# Patient Record
Sex: FEMALE | Race: WHITE | Employment: FULL TIME | URBAN - METROPOLITAN AREA
[De-identification: names, ages, dates, MRNs, and addresses within clinical notes are randomized per-mention and may not be internally consistent; named-entity substitution may affect disease eponyms.]

---

## 2022-12-07 ENCOUNTER — HOSPITAL ENCOUNTER (EMERGENCY)
Age: 47
Discharge: HOME | End: 2022-12-07
Attending: STUDENT IN AN ORGANIZED HEALTH CARE EDUCATION/TRAINING PROGRAM
Payer: COMMERCIAL

## 2022-12-07 ENCOUNTER — APPOINTMENT (OUTPATIENT)
Dept: CT IMAGING | Age: 47
End: 2022-12-07
Attending: STUDENT IN AN ORGANIZED HEALTH CARE EDUCATION/TRAINING PROGRAM
Payer: COMMERCIAL

## 2022-12-07 VITALS
SYSTOLIC BLOOD PRESSURE: 197 MMHG | BODY MASS INDEX: 37.67 KG/M2 | WEIGHT: 240 LBS | DIASTOLIC BLOOD PRESSURE: 122 MMHG | HEART RATE: 84 BPM | HEIGHT: 67 IN | OXYGEN SATURATION: 97 % | TEMPERATURE: 98.3 F | RESPIRATION RATE: 20 BRPM

## 2022-12-07 DIAGNOSIS — I10 PRIMARY HYPERTENSION: Primary | ICD-10-CM

## 2022-12-07 PROCEDURE — 70450 CT HEAD/BRAIN W/O DYE: CPT

## 2022-12-07 PROCEDURE — 99284 EMERGENCY DEPT VISIT MOD MDM: CPT

## 2022-12-07 RX ORDER — LISINOPRIL 20 MG/1
20 TABLET ORAL DAILY
Qty: 30 TABLET | Refills: 0 | Status: SHIPPED | OUTPATIENT
Start: 2022-12-07 | End: 2023-01-06

## 2022-12-08 NOTE — ED PROVIDER NOTES
EMERGENCY DEPARTMENT HISTORY AND PHYSICAL EXAM      Date: 12/7/2022  Patient Name: Maria De Jesus Dalton    History of Presenting Illness     Chief Complaint   Patient presents with    Hypertension       History Provided By: Patient    HPI: Maria De Jesus Dalton, 49 y.o. female with a past medical history significant hypertension presents to the ED with cc of high blood pressure. Patient was sent from neuro-ophthalmology clinic for evaluation of high blood pressure and suspicion for papilledema. Patient states her ophthalmologist referred her to a neuro-ophthalmologist several weeks ago, finally went to appointment today and was told that she has potentially high brain pressure, was sent to emergency department for MRI, MRV. Meanwhile patient denies any headache, blurry vision double vision, weakness in any extremity, chest pains or shortness of breath. Patient states she does have a distant history of hypertension however has not had a PCP for several years and has not taken any blood pressure medications. There are no other complaints, changes, or physical findings at this time. PCP: None    No current facility-administered medications on file prior to encounter. Current Outpatient Medications on File Prior to Encounter   Medication Sig Dispense Refill    albuterol (PROAIR HFA) 90 mcg/actuation inhaler USE 2 PUFFS EVERY 6 HOURSAS NEEDED AS DIRECTED 1 Inhaler 5    labetalol (NORMODYNE) 100 mg tablet 2 tabs in AM and 1 Tab in PM 90 Tab 3    norgestimate-ethinyl estradiol (SPRINTEC, 28,) 0.25-35 mg-mcg per tablet Take one tablet every day at approx the same time every day 28 Tab 4    AMBULATORY BREAST PUMP Use as directed. 1 Device 0    ibuprofen (MOTRIN) 800 mg tablet Take 1 Tab by mouth every eight (8) hours. 30 Tab 0    oxyCODONE-acetaminophen (PERCOCET) 5-325 mg per tablet Take 1 Tab by mouth every six (6) hours as needed for Pain. Max Daily Amount: 4 Tabs.  20 Tab 0    aspirin 81 mg tablet Take 1 Tab by mouth daily. 1 Tab 0    PRENATAL VITS W-CA,FE,FA,<1MG, (PRENATAL VITAMIN PO) Take  by mouth. ZYRTEC 10 mg Tab take 10 mg by mouth daily. Past History     Past Medical History:  Past Medical History:   Diagnosis Date    AR (allergic rhinitis) 2009    Asthma 2009    Eczema 2009    Essential hypertension     Labetelol 300 mg bid    HTN 2009       Past Surgical History:  Past Surgical History:   Procedure Laterality Date    HX  SECTION N/A 5/4/15    Fetal intolerace of labor       Family History:  Family History   Problem Relation Age of Onset    Hypertension Mother     Cancer Mother         breast    Hypertension Father     Hypertension Brother     Diabetes Brother        Social History:  Social History     Tobacco Use    Smoking status: Never    Smokeless tobacco: Never   Substance Use Topics    Alcohol use: No    Drug use: No       Allergies: Allergies   Allergen Reactions    Amoxicillin Hives    Chocolate Flavor Nausea and Vomiting    Coffee (Coffea Arabica) Nausea and Vomiting    Milk Nausea and Vomiting    Potato Starch Other (comments)    Nguyen Pod Other (comments)     exzema and migraines. WHITE BEANS    Onion Other (comments)     exzema and migraines    Pineapple Other (comments)     exema and migraines    Sesame Oil Other (comments)     SESAME SEEDS cause exzema and migraines    Tomato Other (comments)     exema and migraines    Seafood [Shellfish Containing Products] Hives     Hives itching         Review of Systems     Review of Systems   Constitutional:  Negative for activity change, chills, fatigue and fever. HENT:  Negative for congestion and trouble swallowing. Eyes:  Negative for photophobia and visual disturbance. Respiratory:  Negative for cough, chest tightness and shortness of breath. Cardiovascular:  Negative for chest pain and palpitations. Gastrointestinal:  Negative for abdominal distention, abdominal pain, diarrhea, nausea and vomiting. Genitourinary:  Negative for dysuria, flank pain and frequency. Musculoskeletal:  Negative for arthralgias, back pain and myalgias. Skin:  Negative for color change, pallor and rash. Neurological:  Negative for dizziness, tremors, weakness and headaches. Psychiatric/Behavioral:  Negative for confusion. Physical Exam     Physical Exam  Vitals and nursing note reviewed. Constitutional:       General: She is not in acute distress. Appearance: Normal appearance. She is normal weight. She is not ill-appearing. HENT:      Head: Normocephalic and atraumatic. Nose: Nose normal.      Mouth/Throat:      Mouth: Mucous membranes are moist.   Eyes:      General: No scleral icterus. Extraocular Movements: Extraocular movements intact. Conjunctiva/sclera: Conjunctivae normal.      Pupils: Pupils are equal, round, and reactive to light. Cardiovascular:      Rate and Rhythm: Normal rate and regular rhythm. Pulses: Normal pulses. Heart sounds: Normal heart sounds. Pulmonary:      Effort: Pulmonary effort is normal.      Breath sounds: Normal breath sounds. Abdominal:      General: Abdomen is flat. Bowel sounds are normal.      Palpations: Abdomen is soft. Tenderness: There is no abdominal tenderness. There is no guarding. Musculoskeletal:         General: No tenderness. Normal range of motion. Cervical back: Normal range of motion and neck supple. No muscular tenderness. Skin:     General: Skin is warm and dry. Neurological:      General: No focal deficit present. Mental Status: She is alert and oriented to person, place, and time. Sensory: No sensory deficit. Motor: No weakness. Diagnostic Study Results     Labs -   No results found for this or any previous visit (from the past 12 hour(s)).     Radiologic Studies -   @lastxrresult@  CT Results  (Last 48 hours)                 12/07/22 2048  CT HEAD WO CONT Final result    Impression:  No acute findings. Narrative:  EXAM: CT HEAD WO CONT       INDICATION: htn, r/o CVA       COMPARISON: None. CONTRAST: None. TECHNIQUE: Unenhanced CT of the head was performed using 5 mm images. Brain and   bone windows were generated. Coronal and sagittal reformats. CT dose reduction   was achieved through use of a standardized protocol tailored for this   examination and automatic exposure control for dose modulation. FINDINGS:   The ventricles and sulci are normal in size, shape and configuration. . There is   no significant white matter disease. There is no intracranial hemorrhage,   extra-axial collection, or mass effect. The basilar cisterns are open. No CT   evidence of acute infarct. The bone windows demonstrate no abnormalities. The visualized portions of the   paranasal sinuses and mastoid air cells are clear. CXR Results  (Last 48 hours)      None              Medical Decision Making   I am the first provider for this patient. I reviewed the vital signs, available nursing notes, past medical history, past surgical history, family history and social history. Vital Signs-Reviewed the patient's vital signs. Patient Vitals for the past 12 hrs:   Temp Pulse Resp BP SpO2   12/07/22 2142 -- -- -- (!) 197/122 --   12/07/22 2001 98.3 °F (36.8 °C) 84 20 (!) 217/138 97 %       Records Reviewed: Nursing Notes    The patient presents with high blood pressure potential papilledema on ophthalmology visit today with a differential diagnosis of increased intracranial pressure, CVA, intracranial hemorrhage, essential hypertension, noncompliance      Provider Notes (Medical Decision Making):     MDM     79-year-old female history of hypertension, has been noncompliant for several years presents emergency department for evaluation of presumed papilledema as noted by neuro-ophthalmologist.    Patient denies any visual symptoms no chest pain shortness of breath.     Physical shows well-appearing female no distress, is markedly hypertensive on triage 217/138, on my cursory Ortho exam pupils equal round, extraocular movements intact, no visual disturbance. Will obtain head CT to rule out intracranial hemorrhage and/or bleed, provide patient with antihypertensive medication, and has appointment with PCP in 2 weeks. ED Course:   Initial assessment performed. The patients presenting problems have been discussed, and they are in agreement with the care plan formulated and outlined with them. I have encouraged them to ask questions as they arise throughout their visit. ED Course as of 12/07/22 2148   Wed Dec 07, 2022   2131 CT head shows no acute abnormalities, will discharge patient home. [PZ]   2140 I discussed results with patient, I informed patient about signs symptoms of acute increase of intracranial pressure including visual disturbance trouble walking, headache, nausea vomiting. If any of those symptoms occur instructed patient to come back to the ED. patient amenable, states that she remembers she was on lisinopril before, will prescribe patient 1 month of lisinopril, repeat blood pressure shows slight decrease, 190s over 120s, at this time no indication for emergent blood pressure lowering. [PZ]      ED Course User Index  [PZ] Unruly Alston MD         PROCEDURES  Procedures         PLAN:  1. Current Discharge Medication List        START taking these medications    Details   lisinopriL (PRINIVIL, ZESTRIL) 20 mg tablet Take 1 Tablet by mouth daily for 30 days. Qty: 30 Tablet, Refills: 0  Start date: 12/7/2022, End date: 1/6/2023           2. Follow-up Information       Follow up With Specialties Details Why Contact Info    Your primary care physician  In 1 week As appointed           Return to ED if worse     Diagnosis     Clinical Impression:   1.  Primary hypertension

## 2022-12-08 NOTE — ED TRIAGE NOTES
Pt was seen at the eye doctor today and was told that her BP is elevated and needed to come here to be seen. Pt's eye doctor is requesting a brain MRI for concerns for \"increased ICP\".

## 2022-12-14 ENCOUNTER — TRANSCRIBE ORDER (OUTPATIENT)
Dept: SCHEDULING | Age: 47
End: 2022-12-14

## 2022-12-14 DIAGNOSIS — H47.11 PAPILLEDEMA ASSOCIATED WITH INCREASED INTRACRANIAL PRESSURE: Primary | ICD-10-CM

## 2022-12-20 ENCOUNTER — OFFICE VISIT (OUTPATIENT)
Dept: FAMILY MEDICINE CLINIC | Age: 47
End: 2022-12-20

## 2022-12-20 VITALS
BODY MASS INDEX: 40.46 KG/M2 | TEMPERATURE: 98.2 F | DIASTOLIC BLOOD PRESSURE: 115 MMHG | WEIGHT: 257.8 LBS | HEART RATE: 73 BPM | HEIGHT: 67 IN | OXYGEN SATURATION: 98 % | SYSTOLIC BLOOD PRESSURE: 167 MMHG

## 2022-12-20 DIAGNOSIS — I10 ESSENTIAL HYPERTENSION: Primary | ICD-10-CM

## 2022-12-20 DIAGNOSIS — E66.01 CLASS 3 SEVERE OBESITY DUE TO EXCESS CALORIES WITH SERIOUS COMORBIDITY AND BODY MASS INDEX (BMI) OF 40.0 TO 44.9 IN ADULT (HCC): ICD-10-CM

## 2022-12-20 DIAGNOSIS — L40.9 PSORIASIS: ICD-10-CM

## 2022-12-20 PROCEDURE — 3074F SYST BP LT 130 MM HG: CPT | Performed by: STUDENT IN AN ORGANIZED HEALTH CARE EDUCATION/TRAINING PROGRAM

## 2022-12-20 PROCEDURE — 99204 OFFICE O/P NEW MOD 45 MIN: CPT | Performed by: STUDENT IN AN ORGANIZED HEALTH CARE EDUCATION/TRAINING PROGRAM

## 2022-12-20 PROCEDURE — 3078F DIAST BP <80 MM HG: CPT | Performed by: STUDENT IN AN ORGANIZED HEALTH CARE EDUCATION/TRAINING PROGRAM

## 2022-12-20 RX ORDER — FLUOCINONIDE TOPICAL SOLUTION USP, 0.05% 0.5 MG/ML
SOLUTION TOPICAL
Qty: 60 ML | Refills: 0 | Status: SHIPPED | OUTPATIENT
Start: 2022-12-20

## 2022-12-20 RX ORDER — TRIAMCINOLONE ACETONIDE 1 MG/G
OINTMENT TOPICAL 2 TIMES DAILY
Qty: 80 G | Refills: 0 | Status: SHIPPED | OUTPATIENT
Start: 2022-12-20

## 2022-12-20 RX ORDER — LISINOPRIL 40 MG/1
40 TABLET ORAL DAILY
Qty: 30 TABLET | Refills: 1 | Status: SHIPPED | OUTPATIENT
Start: 2022-12-20

## 2022-12-20 NOTE — PROGRESS NOTES
Erick Hall is a 49 y.o. female , id x 2(name and ). Reviewed record, history, and  medications. Chief Complaint   Patient presents with    New Patient    Skin Problem     States that it cleared up when she had her dgt and didn't eat sugar for 7 months v    Blood Pressure Check     Eye dr concerned about BP. Is sending for CT. Was also told at one point that she had pitting edema  Has been treated for htn in the past with lisinopril and is taking it now     Ankle swelling              Vitals:    22 0934 22 0954   BP: (!) 171/113 (!) 167/115   Pulse: 73 73   Temp: 98.2 °F (36.8 °C)    SpO2: 98%    Weight: 257 lb 12.8 oz (116.9 kg)    Height: 5' 7\" (1.702 m)        Coordination of Care Questionnaire:   1. Have you been to the ER, urgent care clinic since your last visit? Hospitalized since your last visit? No    2. Have you seen or consulted any other health care providers outside of the [address] [place] [address] since your last visit? Include any pap smears or colon screening. [facility] found optic nerve was inflammed and fuzzy they sent pt to neuro opthal advised to go to ER asap for BP      3 most recent PHQ Screens 2022   Little interest or pleasure in doing things Not at all   Feeling down, depressed, irritable, or hopeless Several days   Total Score PHQ 2 1       Social Determinants of Health     Tobacco Use: Low Risk     Smoking Tobacco Use: Never    Smokeless Tobacco Use: Never    Passive Exposure: Not on file   Alcohol Use: Not on file   Financial Resource Strain: Low Risk     Difficulty of Paying Living Expenses: Not very hard   Food Insecurity: No Food Insecurity    Worried About Running Out of Food in the Last Year: Never true    Ran Out of Food in the Last Year: Never true   Transportation Needs: No Transportation Needs    Lack of Transportation (Medical): No    Lack of Transportation (Non-Medical):  No   Physical Activity: Not on file   Stress: Not on file   Social Connections: Not on file   Intimate Partner Violence: Not on file   Depression: Not at risk    PHQ-2 Score: 1   Housing Stability: Low Risk     Unable to Pay for Housing in the Last Year: No    Number of Places Lived in the Last Year: 2    Unstable Housing in the Last Year: No       Patient is accompanied by self I have received verbal consent from Isabel Donnelly to discuss any/all medical information while they are present in the room.

## 2022-12-20 NOTE — LETTER
1/3/2023    [address] [id number]      Dear Telma Husain:    Attached are the results of your most recent lab work. I have the following recommendations:      1.  Your metabolic panel which looks at your blood glucose, electrolytes, liver function, and kidney function looks good.       2.  Normal A1c, no sign of diabetes or prediabetes.  Focus on healthy lifestyle to prevent diabetes.      3.  Your TSH which screens for thyroid disease came back normal. This means you likely do not have hyper or hypothyroidism.       4.  Your inflammatory markers are normal.  No sign of autoimmune or psoriatic arthritis on labs.     5.  Your CBC which looks at your white blood cells, red blood cells, and platelets came back looking normal.  No sign of infection or anemia.  Your red blood cells are on the small side, this can happen with iron deficiency.  Consider taking an iron supplement a couple days a week, most common side effect is constipation.     6. Your cholesterol is normal.  Continue with your efforts to follow a heart healthy diet and exercise routinely. As you know the BEST way to lower cholesterol is to follow a strict diet that is low fat combined with regular exercise. Here are a few tips on how to do this:  - Avoid foods that are high in saturated and trans fats (especially fried foods)   - Replace butter with olive oil, avocado oil, other oils that are primarily high in unsaturated fats  - Eat lots of fresh fruits and vegetables  - Choose fish, chicken, and turkey as your serving of meat  - Avoid too many processed foods  - Use whole wheat bread, pasta, rice  You should also try and do 30 minutes of aerobic exercise most days of the week. All of these will contribute to lowering your cholesterol and decrease your risk of heart disease and stroke.      Some values may be minimally outside the \"normal\" range but are not harmful or clinically significant.   Please let me know if you have any questions or concerns regarding these results. I recommend we repeat fasting labs in 6 to 12 months.     Sincerely,      Teresita Christianson MD    Resulted Orders   CBC W/O DIFF   Result Value Ref Range    WBC 6.5 3.4 - 10.8 x10E3/uL    RBC 4.98 3.77 - 5.28 x10E6/uL    HGB 11.8 11.1 - 15.9 g/dL    HCT 38.3 34.0 - 46.6 %    MCV 77 (L) 79 - 97 fL    MCH 23.7 (L) 26.6 - 33.0 pg    MCHC 30.8 (L) 31.5 - 35.7 g/dL    RDW 15.0 11.7 - 15.4 %    PLATELET 007 361 - 450 x10E3/uL    Narrative    Performed at:  [address]  [id number]  : Mckenzie Lees MD, Phone:  [phone]

## 2022-12-20 NOTE — PROGRESS NOTES
Progress Note    she is a 49y.o. year old female who presents for evalution. Assessment/ Plan:   Diagnoses and all orders for this visit:    1. Essential hypertension -uncontrolled, increase lisinopril to 40 mg daily. Continue home blood pressure monitoring and communicate blood pressures via MyChart in 2 weeks  -     lisinopriL (PRINIVIL, ZESTRIL) 40 mg tablet; Take 1 Tablet by mouth daily.  -     AMB POC URINALYSIS DIP STICK AUTO W/O MICRO    2. Psoriasis -skin findings consistent with psoriasis. Start medications as below. Labs to assess for systemic disease  -     triamcinolone acetonide (KENALOG) 0.1 % ointment; Apply  to affected area two (2) times a day. use thin layer  -     fluocinoNIDE (LIDEX) 0.05 % external solution; Apply a thin layer to scalp 2 times daily  -     CYCLIC CITRUL PEPTIDE AB, IGG; Future  -     FLAVIO, DIRECT, W/REFLEX; Future  -     SED RATE (ESR); Future  -     C REACTIVE PROTEIN, QT; Future    3. Class 3 severe obesity due to excess calories with serious comorbidity and body mass index (BMI) of 40.0 to 44.9 in [facility]) -reviewed recommendations for healthy lifestyle. Labs to assess for comorbidity  -     METABOLIC PANEL, COMPREHENSIVE; Future  -     HEMOGLOBIN A1C WITH EAG; Future  -     LIPID PANEL; Future  -     CBC W/O DIFF; Future  -     TSH 3RD GENERATION; Future    Other orders  -     RHEUMATOID FACTOR, QL  -     CYCLIC CITRUL PEPTIDE AB, IGG  -     FLAVIO, DIRECT, W/REFLEX  -     SED RATE (ESR)  -     C REACTIVE PROTEIN, QT  -     CVD REPORT       Follow-up and Dispositions    Return in about 1 month (around 1/20/2023) for follow-up blood pressure. I have discussed the diagnosis with the patient and the intended plan as seen in the above orders. The patient has received an after-visit summary and questions were answered concerning future plans. Pt conveyed understanding of plan.     Medication Side Effects and Warnings were discussed with patient        Subjective:     Chief Complaint   Patient presents with    New Patient    Skin Problem     States that it cleared up when she had her dgt and didn't eat sugar for 7 months v    Blood Pressure Check     Eye dr concerned about BP. Is sending for CT. Was also told at one point that she had pitting edema  Has been treated for htn in the past with lisinopril and is taking it now     Ankle swelling            Issues with blood pressure  At routine eye exam was sent to neuro-ophthalmologist due to optic nerve inflammation  Had been sent to ER for blood pressure medicine  Had a head CT and has a MRI scheduled. No home blood pressure monitoring. Blood pressure medication started 1 week ago. 2 kids, 9 yo girl Guillermina Aguayo) and 10 yo boy Harry Carson, has autism)    Issues with scalp and bilateral hand rash  Longstanding issue  While pregnant with her daughter, she didn't eat sugar for 7 months and skin was better. Reviewed PmHx, RxHx, FmHx, SocHx, AllgHx and updated and dated in the chart. Review of Systems - negative except as listed above in the HPI    Objective:     Vitals:    12/20/22 0934 12/20/22 0954   BP: (!) 171/113 (!) 167/115   Pulse: 73 73   Temp: 98.2 °F (36.8 °C)    SpO2: 98%    Weight: 257 lb 12.8 oz (116.9 kg)    Height: 5' 7\" (1.702 m)        Current Outpatient Medications   Medication Sig    lisinopriL (PRINIVIL, ZESTRIL) 40 mg tablet Take 1 Tablet by mouth daily. triamcinolone acetonide (KENALOG) 0.1 % ointment Apply  to affected area two (2) times a day. use thin layer    fluocinoNIDE (LIDEX) 0.05 % external solution Apply a thin layer to scalp 2 times daily    ZYRTEC 10 mg Tab take 10 mg by mouth daily. No current facility-administered medications for this visit. Physical Exam  Vitals and nursing note reviewed. Constitutional:       General: She is not in acute distress. Appearance: Normal appearance. She is obese.  She is not ill-appearing, toxic-appearing or diaphoretic. HENT:      Head: Normocephalic and atraumatic. Eyes:      General: No scleral icterus. Right eye: No discharge. Left eye: No discharge. Conjunctiva/sclera: Conjunctivae normal.   Cardiovascular:      Rate and Rhythm: Normal rate and regular rhythm. Pulses: Normal pulses. Heart sounds: Normal heart sounds. Pulmonary:      Effort: Pulmonary effort is normal. No respiratory distress. Breath sounds: Normal breath sounds. Musculoskeletal:         General: No tenderness. Cervical back: No rigidity. Skin:     General: Skin is warm and dry. Findings: Rash (erythematous plaques with silver scale over scalp and bilateral hands) present. Neurological:      General: No focal deficit present. Mental Status: She is alert. Psychiatric:         Mood and Affect: Mood normal.         Behavior: Behavior normal.         Thought Content:  Thought content normal.         Judgment: Judgment normal.            Susann Eisenmenger, MD

## 2022-12-20 NOTE — PATIENT INSTRUCTIONS
You should purchase a blood pressure cuff to check your blood pressure at home. [employer] brand upper arm cuff    Check first thing in the morning. You should be relaxed, seated with back supported, feet flat on the floor, arm with cuff resting at heart height. You should check your blood pressure 1-3 times. Please write down your blood pressures and bring this record and your blood pressure cuff/machine to your follow-up visit. I would like for your blood pressure to be less than 130 for the top number and less than 90 for the bottom number. Please follow-up sooner for consistently high blood pressure readings at home. Communicate your numbers after 1 week on the increased dose of lisinopril. Exercise recommendations  150 minutes of moderate intensity cardio exercise in the week  3 days of total body strength training  Work on stretching/flexibility as well. It's great to get outside! But you can also check out youtube for fun videos and workouts.   I like yoga with candice

## 2022-12-22 LAB
ALBUMIN SERPL-MCNC: 4.3 G/DL (ref 3.8–4.8)
ALBUMIN/GLOB SERPL: 1.4 {RATIO} (ref 1.2–2.2)
ALP SERPL-CCNC: 73 IU/L (ref 44–121)
ALT SERPL-CCNC: 14 IU/L (ref 0–32)
ANA SER QL: NEGATIVE
AST SERPL-CCNC: 15 IU/L (ref 0–40)
BILIRUB SERPL-MCNC: 0.3 MG/DL (ref 0–1.2)
BUN SERPL-MCNC: 10 MG/DL (ref 6–24)
BUN/CREAT SERPL: 14 (ref 9–23)
CALCIUM SERPL-MCNC: 9.2 MG/DL (ref 8.7–10.2)
CCP IGA+IGG SERPL IA-ACNC: 6 UNITS (ref 0–19)
CHLORIDE SERPL-SCNC: 101 MMOL/L (ref 96–106)
CHOLEST SERPL-MCNC: 164 MG/DL (ref 100–199)
CO2 SERPL-SCNC: 22 MMOL/L (ref 20–29)
CREAT SERPL-MCNC: 0.7 MG/DL (ref 0.57–1)
CRP SERPL-MCNC: 2 MG/L (ref 0–10)
EGFR: 107 ML/MIN/1.73
ERYTHROCYTE [DISTWIDTH] IN BLOOD BY AUTOMATED COUNT: 15 % (ref 11.7–15.4)
ERYTHROCYTE [SEDIMENTATION RATE] IN BLOOD BY WESTERGREN METHOD: 32 MM/HR (ref 0–32)
EST. AVERAGE GLUCOSE BLD GHB EST-MCNC: 111 MG/DL
GLOBULIN SER CALC-MCNC: 3.1 G/DL (ref 1.5–4.5)
GLUCOSE SERPL-MCNC: 75 MG/DL (ref 70–99)
HBA1C MFR BLD: 5.5 % (ref 4.8–5.6)
HCT VFR BLD AUTO: 38.3 % (ref 34–46.6)
HDLC SERPL-MCNC: 43 MG/DL
HGB BLD-MCNC: 11.8 G/DL (ref 11.1–15.9)
IMP & REVIEW OF LAB RESULTS: NORMAL
LDLC SERPL CALC-MCNC: 95 MG/DL (ref 0–99)
MCH RBC QN AUTO: 23.7 PG (ref 26.6–33)
MCHC RBC AUTO-ENTMCNC: 30.8 G/DL (ref 31.5–35.7)
MCV RBC AUTO: 77 FL (ref 79–97)
PLATELET # BLD AUTO: 301 X10E3/UL (ref 150–450)
POTASSIUM SERPL-SCNC: 4.1 MMOL/L (ref 3.5–5.2)
PROT SERPL-MCNC: 7.4 G/DL (ref 6–8.5)
RBC # BLD AUTO: 4.98 X10E6/UL (ref 3.77–5.28)
RHEUMATOID FACT SERPL-ACNC: <10 IU/ML (ref 0–15)
SODIUM SERPL-SCNC: 137 MMOL/L (ref 134–144)
TRIGL SERPL-MCNC: 145 MG/DL (ref 0–149)
TSH SERPL DL<=0.005 MIU/L-ACNC: 0.95 UIU/ML (ref 0.45–4.5)
VLDLC SERPL CALC-MCNC: 26 MG/DL (ref 5–40)
WBC # BLD AUTO: 6.5 X10E3/UL (ref 3.4–10.8)

## 2022-12-23 NOTE — PROGRESS NOTES
Normal CMP, A1c, TSH, inflammatory markers. Microcytosis without anemia, CBC otherwise normal.  Normal lipids, 10-year risk 2.5%.   Focus on healthy lifestyle

## 2023-01-05 ENCOUNTER — PATIENT MESSAGE (OUTPATIENT)
Dept: FAMILY MEDICINE CLINIC | Age: 48
End: 2023-01-05

## 2023-01-05 DIAGNOSIS — I10 ESSENTIAL HYPERTENSION: Primary | ICD-10-CM

## 2023-01-08 PROBLEM — L40.9 PSORIASIS: Status: ACTIVE | Noted: 2023-01-08

## 2023-01-18 RX ORDER — CHLORTHALIDONE 25 MG/1
25 TABLET ORAL DAILY
Qty: 30 TABLET | Refills: 1 | Status: SHIPPED | OUTPATIENT
Start: 2023-01-18

## 2023-01-25 ENCOUNTER — TELEPHONE (OUTPATIENT)
Dept: PRIMARY CARE CLINIC | Age: 48
End: 2023-01-25

## 2023-01-25 ENCOUNTER — OFFICE VISIT (OUTPATIENT)
Dept: PRIMARY CARE CLINIC | Age: 48
End: 2023-01-25
Payer: COMMERCIAL

## 2023-01-25 VITALS
SYSTOLIC BLOOD PRESSURE: 130 MMHG | BODY MASS INDEX: 40.65 KG/M2 | HEIGHT: 67 IN | WEIGHT: 259 LBS | DIASTOLIC BLOOD PRESSURE: 80 MMHG | RESPIRATION RATE: 20 BRPM | TEMPERATURE: 97.2 F | OXYGEN SATURATION: 98 % | HEART RATE: 77 BPM

## 2023-01-25 DIAGNOSIS — Z12.31 ENCOUNTER FOR SCREENING MAMMOGRAM FOR MALIGNANT NEOPLASM OF BREAST: ICD-10-CM

## 2023-01-25 DIAGNOSIS — R21 RASH AND NONSPECIFIC SKIN ERUPTION: ICD-10-CM

## 2023-01-25 DIAGNOSIS — Z12.11 SCREENING FOR MALIGNANT NEOPLASM OF COLON: ICD-10-CM

## 2023-01-25 DIAGNOSIS — I10 ESSENTIAL HYPERTENSION: Primary | ICD-10-CM

## 2023-01-25 PROCEDURE — 99203 OFFICE O/P NEW LOW 30 MIN: CPT | Performed by: FAMILY MEDICINE

## 2023-01-25 PROCEDURE — 3075F SYST BP GE 130 - 139MM HG: CPT | Performed by: FAMILY MEDICINE

## 2023-01-25 PROCEDURE — 3079F DIAST BP 80-89 MM HG: CPT | Performed by: FAMILY MEDICINE

## 2023-01-25 NOTE — PROGRESS NOTES
HPI     Chief Complaint:   Chief Complaint   Patient presents with   [address] is an 49 y.o. female. No consistent PCP for 7 years. Medical history significant for:   Patient Active Problem List   Diagnosis Code    Eczema L30.9    Exercise induced bronchospasm J45.990    Essential hypertension I10    Class 3 severe obesity due to excess calories with serious comorbidity and body mass index (BMI) of 40.0 to 44.9 in adult (HCC) E66.01, Z68.41    Psoriasis L40.9       Concerns for today's visit:  Essential HTN: patient recently diagnosed with HTN. Saw another provider and placed on chlorthalidone 25mg daily and lisinopril 40mg. She is compliant and tolerates well without cough or dizziness. She says this all came about after she say an eye doctor and \"my optic nerve looked puffy\" but stable for time. She follows with Dr. Blaire Coombs, Ophthalmology. Recent labs with Dr. Raman Dubois. Skin issues: ongoing for years. Gets scales/plaques in hair and lesions on arbs. It can itch. Recently other PCP diagnosed her with PCP. Fluocinoniden has helped some but not completely and does not improve scalp rash. Auto-immune workup negative (mom has RA). Medications - reviewed:  Current Outpatient Medications on File Prior to Visit   Medication Sig Dispense Refill    chlorthalidone (HYGROTON) 25 mg tablet Take 1 Tablet by mouth daily. 30 Tablet 1    lisinopriL (PRINIVIL, ZESTRIL) 40 mg tablet Take 1 Tablet by mouth daily. 30 Tablet 1    triamcinolone acetonide (KENALOG) 0.1 % ointment Apply  to affected area two (2) times a day. use thin layer (Patient not taking: Reported on 1/25/2023) 80 g 0    fluocinoNIDE (LIDEX) 0.05 % external solution Apply a thin layer to scalp 2 times daily (Patient not taking: Reported on 1/25/2023) 60 mL 0    ZYRTEC 10 mg Tab take 10 mg by mouth daily.  (Patient not taking: Reported on 1/25/2023)       No current facility-administered medications on file prior to visit. Allergies - reviewed: Allergies   Allergen Reactions    Amoxicillin Hives    Chocolate Flavor Nausea and Vomiting    Coffee (Coffea Arabica) Nausea and Vomiting    Milk Nausea and Vomiting    Potato Starch Other (comments)     \"Skin goes crazy\"    Bean Pod Other (comments)     eczema and migraines. WHITE BEANS    Onion Other (comments)     eczema and migraines    Pineapple Other (comments)     eczema and migraines    Sesame Oil Other (comments)     SESAME SEEDS cause exzema and migraines    Tomato Other (comments)     eczema and migraines    Seafood [Shellfish Containing Products] Hives     Hives itching       Past Medical History - reviewed:  Past Medical History:   Diagnosis Date    AR (allergic rhinitis) 2009    Asthma 2009    Eczema 2009    Essential hypertension     Labetelol 300 mg bid    HTN 2009       Past Surgical History - reviewed:  Past Surgical History:   Procedure Laterality Date    HX  SECTION N/A 5/4/15    Fetal intolerace of labor       Family History - reviewed:  Family History   Problem Relation Age of Onset    Hypertension Mother     Cancer Mother 36        breast    Hypertension Father     Stroke Father     Hypertension Brother     Diabetes Brother         Type 2 DM    Diabetes Brother         Type 1 DM       Social History - reviewed:  Social History     Tobacco Use    Smoking status: Never    Smokeless tobacco: Never   Vaping Use    Vaping Use: Never used   Substance Use Topics    Alcohol use: No    Drug use: No         Immunizations - reviewed:   Immunization History   Administered Date(s) Administered    Influenza, FLUARIX, FLULAVAL, FLUZONE (age 10 mo+) AND AFLURIA, (age 1 y+), PF, 0.5mL 10/27/2014    MMR 2015    PPD 2009    Tdap 2015       Review of Systems   Review of Systems   Respiratory:  Negative for cough and shortness of breath.     Cardiovascular:  Negative for chest pain and palpitations. Objective     Visit Vitals  /80 (BP 1 Location: Left upper arm, BP Patient Position: Sitting, BP Cuff Size: Large adult)   Pulse 77   Temp 97.2 °F (36.2 °C) (Temporal)   Resp 20   Ht 5' 7\" (1.702 m)   Wt 259 lb (117.5 kg)   LMP 01/21/2023   SpO2 98%   BMI 40.57 kg/m²       Physical Exam  Vitals and nursing note reviewed. Constitutional:       General: She is not in acute distress. Cardiovascular:      Rate and Rhythm: Normal rate and regular rhythm. Pulmonary:      Effort: Pulmonary effort is normal. No respiratory distress. Breath sounds: Normal breath sounds. Skin:     General: Skin is warm. Coloration: Skin is not jaundiced. Comments: Scaly plaques noted in scalp and along dorsum of arms bilaterally. Neurological:      Mental Status: She is alert. Assessment and Plan     Diagnoses and all orders for this visit:    1. Essential hypertension  Comments: At goal. Continue current regimen. DASH diet encouraged. 2. Rash and nonspecific skin eruption  Comments:  Exam consistent with psoriasis. Referring to dermatology for further recs on management as symptoms not controlled with current topical therapy. Orders:  -     REFERRAL TO DERMATOLOGY    3. Encounter for screening mammogram for malignant neoplasm of breast  -     [facility] MAMMO BI SCREENING INCL CAD; Future    4. Screening for malignant neoplasm of colon  -     REFERRAL TO GASTROENTEROLOGY       Follow-up and Dispositions    Return in about 1 month (around 2/25/2023) for annual physical.         I discussed the aforementioned diagnoses with the patient as well as the plan of care. All questions were answered and an AVS was provided. As applicable:  Medication Side Effects and Warnings were discussed with patient, as indicated. Patient Labs were reviewed and or requested, as indicated. Patient Past Records were reviewed and or requested, as indicated.     Domonique Nielson MD  [address] [facility]  [facility] [address], [place], [address]

## 2023-01-25 NOTE — TELEPHONE ENCOUNTER
Provider: Dr. Dorita Bell?: [x]Yes []No []Not Needed  Date: March 15th  Time: 11:00am   Visit Type: Physical  Notes: annual physical

## 2023-01-25 NOTE — PROGRESS NOTES
Identified Patient with two Patient identifiers(name and ). 1. \"Have you been to the ER, urgent care clinic since your last visit? Hospitalized since your last visit? \" No    2. \"Have you seen or consulted any other health care providers outside of the [address] [place] [address] since your last visit? \"  Yes      3. For patients aged 39-70: Has the patient had a colonoscopy / FIT/ Cologuard? No      If the patient is female:    4. For patients aged 41-77: Has the patient had a mammogram within the past 2 years? No      5. For patients aged 21-65: Has the patient had a pap smear?  No     Visit Vitals  LMP 2023   Visit Vitals  /80 (BP 1 Location: Left upper arm, BP Patient Position: Sitting, BP Cuff Size: Large adult)   Pulse 77   Temp 97.2 °F (36.2 °C) (Temporal)   Resp 20   Ht 5' 7\" (1.702 m)   Wt 259 lb (117.5 kg)   LMP 2023   SpO2 98%   BMI 40.57 kg/m²         Chief Complaint   Patient presents with    [address] [place] [address] Due   Topic Date Due    Hepatitis C Screening  Never done    COVID-19 Vaccine (1) Never done    Cervical cancer screen  12/15/2016    Colorectal Cancer Screening Combo  Never done    Flu Vaccine (1) 2022

## 2023-01-30 ENCOUNTER — HOSPITAL ENCOUNTER (OUTPATIENT)
Dept: MAMMOGRAPHY | Age: 48
Discharge: HOME | End: 2023-01-30
Attending: FAMILY MEDICINE
Payer: COMMERCIAL

## 2023-01-30 DIAGNOSIS — Z12.31 ENCOUNTER FOR SCREENING MAMMOGRAM FOR MALIGNANT NEOPLASM OF BREAST: ICD-10-CM

## 2023-01-30 PROCEDURE — 77063 BREAST TOMOSYNTHESIS BI: CPT

## 2023-01-31 DIAGNOSIS — R92.8 ABNORMAL MAMMOGRAM: Primary | ICD-10-CM

## 2023-02-08 ENCOUNTER — HOSPITAL ENCOUNTER (OUTPATIENT)
Dept: MAMMOGRAPHY | Age: 48
Discharge: HOME | End: 2023-02-08
Attending: FAMILY MEDICINE
Payer: COMMERCIAL

## 2023-02-08 ENCOUNTER — HOSPITAL ENCOUNTER (OUTPATIENT)
Dept: MAMMOGRAPHY | Age: 48
End: 2023-02-08
Attending: FAMILY MEDICINE
Payer: COMMERCIAL

## 2023-02-08 DIAGNOSIS — R92.8 ABNORMAL MAMMOGRAM: ICD-10-CM

## 2023-02-08 PROCEDURE — 76642 ULTRASOUND BREAST LIMITED: CPT

## 2023-02-08 PROCEDURE — 77061 BREAST TOMOSYNTHESIS UNI: CPT

## 2023-02-08 NOTE — PROGRESS NOTES
[facility] message sent:    Lloyd Harper,    Your mammogram was considered benign. We will continue with your annual screenings.     Dr. Gus Armstrong

## 2023-02-14 ENCOUNTER — PATIENT MESSAGE (OUTPATIENT)
Dept: PRIMARY CARE CLINIC | Age: 48
End: 2023-02-14

## 2023-02-14 DIAGNOSIS — I10 ESSENTIAL HYPERTENSION: ICD-10-CM

## 2023-02-14 RX ORDER — LISINOPRIL 40 MG/1
40 TABLET ORAL DAILY
Qty: 30 TABLET | Refills: 1 | Status: SHIPPED | OUTPATIENT
Start: 2023-02-14

## 2023-02-17 ENCOUNTER — VIRTUAL VISIT (OUTPATIENT)
Dept: PRIMARY CARE CLINIC | Age: 48
End: 2023-02-17
Payer: COMMERCIAL

## 2023-02-17 DIAGNOSIS — S29.012A UPPER BACK STRAIN, INITIAL ENCOUNTER: Primary | ICD-10-CM

## 2023-02-17 PROCEDURE — 99213 OFFICE O/P EST LOW 20 MIN: CPT | Performed by: FAMILY MEDICINE

## 2023-02-17 RX ORDER — CYCLOBENZAPRINE HCL 5 MG
5 TABLET ORAL
Qty: 30 TABLET | Refills: 0 | Status: SHIPPED | OUTPATIENT
Start: 2023-02-17

## 2023-02-17 NOTE — PROGRESS NOTES
Chief Complaint   Patient presents with    Back Pain    Visit Vitals  [facility] 01/21/2023    1. Have you been to the ER, urgent care clinic since your last visit? Hospitalized since your last visit? No    2. Have you seen or consulted any other health care providers outside of the [address] [place] [address] since your last visit? Include any pap smears or colon screening.  No

## 2023-02-17 NOTE — PROGRESS NOTES
Burak Tran (: 1975) is a 49 y.o. female, established patient, here for evaluation of the following chief complaint(s):   Back Pain    ASSESSMENT/PLAN:  Below is the assessment and plan developed based on review of pertinent history, labs, studies, and medications. 1. Upper back strain, initial encounter  Comments:  Hx suggests MSK etiology. Flexiril prn. Monitor posture. No cardiac or GI symptoms on ROS. Follow up INI. Orders:  -     cyclobenzaprine (FLEXERIL) 5 mg tablet; Take 1 Tablet by mouth three (3) times daily as needed for Muscle Spasm(s). , Normal, Disp-30 Tablet, R-0    No follow-ups on file. SUBJECTIVE/OBJECTIVE:  Two weeks of upper achy back pain worse after a long day of work. Patient works as a . No cough or shortness of breath. No fever. No injury. It feels uncomfortable, in her muscles and she describes it as if she's been exercising much. Review of Systems   Respiratory:  Negative for cough and shortness of breath. Cardiovascular:  Negative for chest pain and palpitations. Gastrointestinal:  Negative for abdominal distention, constipation and diarrhea. Musculoskeletal:  Positive for back pain and myalgias. No data recorded     Physical Exam  Nursing note reviewed. Constitutional:       General: She is not in acute distress. HENT:      Head: Normocephalic and atraumatic. Pulmonary:      Effort: Pulmonary effort is normal. No respiratory distress. Comments: Talks in full sentences. Neurological:      Mental Status: She is alert. Burak Tran, was evaluated through a synchronous (real-time) audio-video encounter. The patient (or guardian if applicable) is aware that this is a billable service, which includes applicable co-pays. This Virtual Visit was conducted with patient's (and/or legal guardian's) consent.  The visit was conducted pursuant to the emergency declaration under the [facility], [facility] authority and the [employer] General Act. Patient identification was verified, and a caregiver was present when appropriate. The patient was located at: Home: [place]  The provider was located at: Home: [address] was used to authenticate this note.   -- Arnoldo Moss MD

## 2023-02-17 NOTE — PROGRESS NOTES
Visit Vitals  [facility] 01/21/2023     Chief Complaint   Patient presents with    Back Pain       1. \"Have you been to the ER, urgent care clinic since your last visit? Hospitalized since your last visit? \" No    2. \"Have you seen or consulted any other health care providers outside of the [address] [place] [address] since your last visit? \" No     3. For patients aged 39-70: Has the patient had a colonoscopy / FIT/ Cologuard? No      If the patient is female:    4. For patients aged 41-77: Has the patient had a mammogram within the past 2 years? No      5. For patients aged 21-65: Has the patient had a pap smear?  No

## 2023-03-16 ENCOUNTER — OFFICE VISIT (OUTPATIENT)
Dept: PRIMARY CARE CLINIC | Age: 48
End: 2023-03-16

## 2023-03-16 VITALS
DIASTOLIC BLOOD PRESSURE: 80 MMHG | RESPIRATION RATE: 20 BRPM | HEIGHT: 67 IN | BODY MASS INDEX: 40.24 KG/M2 | TEMPERATURE: 97.6 F | OXYGEN SATURATION: 97 % | SYSTOLIC BLOOD PRESSURE: 122 MMHG | HEART RATE: 78 BPM | WEIGHT: 256.4 LBS

## 2023-03-16 DIAGNOSIS — Z01.419 WELL WOMAN EXAM WITH ROUTINE GYNECOLOGICAL EXAM: Primary | ICD-10-CM

## 2023-03-16 DIAGNOSIS — Z11.59 NEED FOR HEPATITIS C SCREENING TEST: ICD-10-CM

## 2023-03-16 DIAGNOSIS — R10.9 SIDE PAIN: ICD-10-CM

## 2023-03-16 DIAGNOSIS — E66.01 CLASS 3 SEVERE OBESITY DUE TO EXCESS CALORIES WITHOUT SERIOUS COMORBIDITY WITH BODY MASS INDEX (BMI) OF 40.0 TO 44.9 IN ADULT (HCC): ICD-10-CM

## 2023-03-16 DIAGNOSIS — Z13.6 ENCOUNTER FOR SCREENING FOR CARDIOVASCULAR DISORDERS: ICD-10-CM

## 2023-03-16 NOTE — PROGRESS NOTES
HPI     Chief Complaint   Patient presents with    Physical       Jacob Abraham is a 49 y.o. female presenting for well woman exam and is also due for follow up care of chronic issues. Jacob Abraham is willing to do both appointments today and realizes that there may be a co-pay for the follow-up portion of the visit. She has a history of:  Patient Active Problem List   Diagnosis Code    Eczema L30.9    Exercise induced bronchospasm J45.990    Essential hypertension I10    Class 3 severe obesity due to excess calories with serious comorbidity and body mass index (BMI) of 40.0 to 44.9 in adult ([facility] [address] E66.01, Z68.41    Psoriasis L40.9       Specialist: Dr. Benitez Ziegler, [facility]  Occupation: Works at [employer]. Dentist: overdue    Health Maintenance reviewed -  Colonoscopy - has appt with GI today  HCV screening - due  HIV screening - declines  COVID vaccines - vaccinated no booster  Flu - has not had it. Chronic Problems  HTN is stable and she is compliant with her medication. Concerns today:   Side pain: Patient continues to have bilateral side pain that runs right along her breast and under the axilla. It does not radiate to the anterior chest wall. She describes it as a dull ache and it is exacerbated by activities at work such as lifting or bagging groceries. She mostly only notices the discomfort at work. She denies trauma. Symptoms do often subside after a few minutes and/or with rest.  She is concerned that it continues. She denies chest pain, palpitations, shortness of breath, abdominal pain, nausea. Gyn History  She gets a monthly menstrual cycle. OB History          2    Para   2    Term   2       0    AB   0    Living   2         SAB   0    IAB   0    Ectopic   0    Molar        Multiple   0    Live Births   2                  Diet and Exercise  Diet: \"definitely needs improvement\". Not exercising.      Family History  Mother had breast cancer: 40, survived. No FH of ovarian cancer. No FH of colon cancer. Social History  Denies smoking . Denies heavy alcohol use. No illicit drug use. Allergies- reviewed  Allergies   Allergen Reactions    Amoxicillin Hives    Chocolate Flavor Nausea and Vomiting    Coffee (Coffea Arabica) Nausea and Vomiting    Milk Nausea and Vomiting    Potato Starch Other (comments)     \"Skin goes crazy\"    Nguyen Pod Other (comments)     eczema and migraines. WHITE BEANS    Onion Other (comments)     eczema and migraines    Pineapple Other (comments)     eczema and migraines    Sesame Oil Other (comments)     SESAME SEEDS cause exzema and migraines    Tomato Other (comments)     eczema and migraines    Seafood [Shellfish Containing Products] Hives     Hives itching       Medications- reviewed  Current Outpatient Medications   Medication Sig    lisinopriL (PRINIVIL, ZESTRIL) 40 mg tablet Take 1 Tablet by mouth daily. chlorthalidone (HYGROTON) 25 mg tablet Take 1 Tablet by mouth daily. ZYRTEC 10 mg Tab Take 10 mg by mouth daily. No current facility-administered medications for this visit. Immunizations - reviewed:   Immunization History   Administered Date(s) Administered    Influenza, FLUARIX, FLULAVAL, [address] (age 10 mo+) AND AFLURIA, (age 1 y+), PF, 0.5mL 10/27/2014    MMR 05/08/2015    PPD 04/17/2009    Tdap 04/02/2015     Flu: recommended every fall. Review of Systems   Review of Systems   Constitutional:  Negative for chills and fever. HENT:  Negative for congestion and sore throat. Eyes:  Negative for discharge and redness. Respiratory:  Negative for cough and shortness of breath. Cardiovascular:  Negative for chest pain and palpitations. Gastrointestinal:  Negative for abdominal pain, blood in stool, heartburn, nausea and vomiting. Musculoskeletal:  Positive for myalgias. Negative for back pain and falls. Neurological:  Negative for focal weakness and headaches.    Endo/Heme/Allergies: Negative for polydipsia. Does not bruise/bleed easily. Psychiatric/Behavioral:  Negative for hallucinations and substance abuse. Reviewed PmHx, FmHx, SocHx as well as meds and allergies, updated and dated in the chart. Social History     Tobacco Use    Smoking status: Never    Smokeless tobacco: Never   Vaping Use    Vaping Use: Never used   Substance Use Topics    Alcohol use: No    Drug use: No     Past Medical History:   Diagnosis Date    AR (allergic rhinitis) 2/16/2009    Asthma 2/16/2009    Eczema 2/16/2009    Essential hypertension     Labetelol 300 mg bid    HTN 2/16/2009     Family History   Problem Relation Age of Onset    Breast Cancer Mother 40    Hypertension Father     Stroke Father     Hypertension Brother     Diabetes Brother         Type 2 DM    Diabetes Brother         Type 1 DM          Objective     Visit Vitals  /80 (BP 1 Location: Left upper arm, BP Patient Position: Sitting, BP Cuff Size: Large adult)   Pulse 78   Temp 97.6 °F (36.4 °C) (Temporal)   Resp 20   Ht 5' 7\" (1.702 m)   Wt 256 lb 6.4 oz (116.3 kg)   LMP 02/21/2023   SpO2 97%   BMI 40.16 kg/m²       Physical Exam  Vitals and nursing note reviewed. Constitutional:       General: She is not in acute distress. Appearance: Normal appearance. HENT:      Head: Normocephalic and atraumatic. Right Ear: Tympanic membrane and external ear normal.      Left Ear: Tympanic membrane and external ear normal.      Nose: Nose normal. No rhinorrhea. Mouth/Throat:      Mouth: Mucous membranes are moist.      Pharynx: Oropharynx is clear. No oropharyngeal exudate. Eyes:      Extraocular Movements: Extraocular movements intact. Conjunctiva/sclera: Conjunctivae normal.      Pupils: Pupils are equal, round, and reactive to light. Cardiovascular:      Rate and Rhythm: Normal rate and regular rhythm. Heart sounds: Normal heart sounds. No murmur heard.   Pulmonary:      Effort: Pulmonary effort is normal. No respiratory distress. Breath sounds: Normal breath sounds. No rales. Abdominal:      General: Abdomen is flat. There is no distension. Palpations: Abdomen is soft. Genitourinary:     Labia:         Right: No rash. Left: No rash. Musculoskeletal:         General: No swelling or deformity. Normal range of motion. Cervical back: Normal range of motion and neck supple. Skin:     General: Skin is warm. Capillary Refill: Capillary refill takes less than 2 seconds. Comments: Psoriasis on hands   Neurological:      General: No focal deficit present. Mental Status: She is alert and oriented to person, place, and time. Mental status is at baseline. Psychiatric:         Mood and Affect: Mood normal.         Behavior: Behavior normal.      Exam chaperoned by Nathalia Nielsen LPN  Breast -breasts appear normal, no suspicious masses, no skin or nipple changes or axillary nodes . Pelvic - External genitalia normal without rashes or lesions. Pink and moist vaginal mucosa. Scant white discharge. Cervix without lesions or abnormal discharge. Uterus non tender and normal size. No adnexal masses or tenderness. Assessment and Plan     Diagnoses and all orders for this visit:    1. Well woman exam with routine gynecological exam  Comments:  Age appropriate guidance, HM updated. Declines flu shot. Orders:  -     CBC W/O DIFF  -     METABOLIC PANEL, COMPREHENSIVE  -     HEPATITIS C AB  -     LIPID PANEL  -     PAP IG, APTIMA HPV AND RFX 16/18,45 ([id number])    2. Side pain  Comments:  Non specific w/o cardiac/GI/or respirtaroy symptoms. Concerning for MSK etiology. Will check Xrays and follow up results. Orders:  -     XR RIBS RT UNI 2 V; Future  -     XR RIBS LT UNI 2 V; Future    3. Class 3 severe obesity due to excess calories without serious comorbidity with body mass index (BMI) of 40.0 to 44.9 in adult [facility])  Comments:  Pt encouraged to increase exercise to 120m weekly.  Healthy lifestyle options encouraged. Orders:  -     CBC W/O DIFF  -     METABOLIC PANEL, COMPREHENSIVE  -     LIPID PANEL    4. Need for hepatitis C screening test  -     HEPATITIS C AB    5. Encounter for screening for cardiovascular disorders  -     CBC W/O DIFF  -     LIPID PANEL           Follow-up and Dispositions    Return in about 6 months (around 9/16/2023) for Chronic:, HTN. As applicable:  Medication Side Effects and Warnings were discussed with patient, as indicated. Patient Labs were reviewed and or requested, as indicated. Patient Past Records were reviewed and or requested, as indicated. I discussed the aforementioned diagnoses with the patient as well as the plan of care. All questions were answered and an AVS was provided.        Suresh Christianson MD  [address] [place] [address]

## 2023-03-16 NOTE — PROGRESS NOTES
Identified Patient with two Patient identifiers(name and ). 1. \"Have you been to the ER, urgent care clinic since your last visit? Hospitalized since your last visit? \" No    2. \"Have you seen or consulted any other health care providers outside of the [address] [place] [address] since your last visit? \"  Eye      3. For patients aged 39-70: Has the patient had a colonoscopy / FIT/ Cologuard? No      If the patient is female:    4. For patients aged 41-77: Has the patient had a mammogram within the past 2 years? Yes - no [facility] [id number]      5. For patients aged 21-65: Has the patient had a pap smear? No     There were no vitals taken for this visit.     Chief Complaint   Patient presents with    Physical       Health Maintenance Due   Topic Date Due    Hepatitis C Screening  Never done    COVID-19 Vaccine (1) Never done    Cervical cancer screen  12/15/2016    Colorectal Cancer Screening Combo  Never done    Flu Vaccine (1) 2022    Visit Vitals  /80 (BP 1 Location: Left upper arm, BP Patient Position: Sitting, BP Cuff Size: Large adult)   Pulse 78   Temp 97.6 °F (36.4 °C) (Temporal)   Resp 20   Ht 5' 7\" (1.702 m)   Wt 256 lb 6.4 oz (116.3 kg)   LMP 2023   SpO2 97%   BMI 40.16 kg/m²

## 2023-03-17 DIAGNOSIS — E87.1 HYPONATREMIA: Primary | ICD-10-CM

## 2023-03-17 LAB
ALBUMIN SERPL-MCNC: 4.3 G/DL (ref 3.8–4.8)
ALBUMIN/GLOB SERPL: 1.2 {RATIO} (ref 1.2–2.2)
ALP SERPL-CCNC: 65 IU/L (ref 44–121)
ALT SERPL-CCNC: 16 IU/L (ref 0–32)
AST SERPL-CCNC: 19 IU/L (ref 0–40)
BILIRUB SERPL-MCNC: 0.2 MG/DL (ref 0–1.2)
BUN SERPL-MCNC: 17 MG/DL (ref 6–24)
BUN/CREAT SERPL: 20 (ref 9–23)
CALCIUM SERPL-MCNC: 9.3 MG/DL (ref 8.7–10.2)
CHLORIDE SERPL-SCNC: 97 MMOL/L (ref 96–106)
CHOLEST SERPL-MCNC: 160 MG/DL (ref 100–199)
CO2 SERPL-SCNC: 22 MMOL/L (ref 20–29)
CREAT SERPL-MCNC: 0.86 MG/DL (ref 0.57–1)
EGFRCR SERPLBLD CKD-EPI 2021: 83 ML/MIN/1.73
ERYTHROCYTE [DISTWIDTH] IN BLOOD BY AUTOMATED COUNT: 17.3 % (ref 11.7–15.4)
GLOBULIN SER CALC-MCNC: 3.5 G/DL (ref 1.5–4.5)
GLUCOSE SERPL-MCNC: 88 MG/DL (ref 70–99)
HCT VFR BLD AUTO: 37.3 % (ref 34–46.6)
HCV IGG SERPL QL IA: NON REACTIVE
HDLC SERPL-MCNC: 47 MG/DL
HGB BLD-MCNC: 12.5 G/DL (ref 11.1–15.9)
LDLC SERPL CALC-MCNC: 83 MG/DL (ref 0–99)
MCH RBC QN AUTO: 26.7 PG (ref 26.6–33)
MCHC RBC AUTO-ENTMCNC: 33.5 G/DL (ref 31.5–35.7)
MCV RBC AUTO: 80 FL (ref 79–97)
PLATELET # BLD AUTO: 243 X10E3/UL (ref 150–450)
POTASSIUM SERPL-SCNC: 3.8 MMOL/L (ref 3.5–5.2)
PROT SERPL-MCNC: 7.8 G/DL (ref 6–8.5)
RBC # BLD AUTO: 4.69 X10E6/UL (ref 3.77–5.28)
SODIUM SERPL-SCNC: 132 MMOL/L (ref 134–144)
TRIGL SERPL-MCNC: 176 MG/DL (ref 0–149)
VLDLC SERPL CALC-MCNC: 30 MG/DL (ref 5–40)
WBC # BLD AUTO: 7.7 X10E3/UL (ref 3.4–10.8)

## 2023-03-17 NOTE — PROGRESS NOTES
Please call patient. Sodium is very low. Has she been taking any supplements or fasting? I want to repeat this in 1 week. Standing lab order placed. Otherwise, her blood counts are within acceptable limits. Kidney function and liver function are within normal limits. Hepatitis C screening is negative. Triglycerides are elevated but remaining cholesterol panel is normal.  Please encourage a heart healthy diet and we will monitor this on future labs within 6 months.

## 2023-03-22 LAB
CYTOLOGIST CVX/VAG CYTO: NORMAL
CYTOLOGY CVX/VAG DOC CYTO: NORMAL
CYTOLOGY CVX/VAG DOC THIN PREP: NORMAL
DX ICD CODE: NORMAL
HPV GENOTYPE REFLEX: NORMAL
HPV I/H RISK 4 DNA CVX QL PROBE+SIG AMP: NEGATIVE
Lab: NORMAL
OTHER STN SPEC: NORMAL
STAT OF ADQ CVX/VAG CYTO-IMP: NORMAL

## 2023-03-24 DIAGNOSIS — I10 ESSENTIAL HYPERTENSION: ICD-10-CM

## 2023-03-28 ENCOUNTER — OFFICE VISIT (OUTPATIENT)
Dept: NEUROLOGY | Age: 48
End: 2023-03-28
Payer: COMMERCIAL

## 2023-03-28 VITALS
WEIGHT: 256 LBS | SYSTOLIC BLOOD PRESSURE: 140 MMHG | HEART RATE: 87 BPM | HEIGHT: 67 IN | OXYGEN SATURATION: 99 % | BODY MASS INDEX: 40.18 KG/M2 | DIASTOLIC BLOOD PRESSURE: 88 MMHG

## 2023-03-28 DIAGNOSIS — E23.6 EMPTY SELLA (HCC): ICD-10-CM

## 2023-03-28 DIAGNOSIS — H47.10 PAPILLEDEMA: Primary | ICD-10-CM

## 2023-03-28 PROCEDURE — 3079F DIAST BP 80-89 MM HG: CPT | Performed by: PSYCHIATRY & NEUROLOGY

## 2023-03-28 PROCEDURE — 99205 OFFICE O/P NEW HI 60 MIN: CPT | Performed by: PSYCHIATRY & NEUROLOGY

## 2023-03-28 PROCEDURE — 3077F SYST BP >= 140 MM HG: CPT | Performed by: PSYCHIATRY & NEUROLOGY

## 2023-03-28 RX ORDER — CHLORTHALIDONE 25 MG/1
25 TABLET ORAL DAILY
Qty: 30 TABLET | Refills: 1 | Status: SHIPPED | OUTPATIENT
Start: 2023-03-28

## 2023-03-28 NOTE — PROGRESS NOTES
NEUROLOGY  NEW PATIENT EVALUATION/CONSULTATION       PATIENT NAME: Lynette Hendrickson    MRN: [id number]    REASON FOR CONSULTATION: Papilledema    23      Previous records (physician notes, laboratory reports, and radiology reports) and imaging studies were reviewed and summarized. My recommendations will be communicated back to the patient's physician(s) via electronic medical record and/or by [facility] [facility] mail. HISTORY OF PRESENT ILLNESS:  Lynette Hendrickson is a 49 y.o.  female presenting for evaluation of papilledema, concern for IIH. She is referred by Ophthalmology. She denies vision loss, pulsatile tinnitus. Findings were noted on routine eye examination. She reports occasional headaches occurring once weekly at most since her 20's localized to the vertex with intermittent nausea, photophobia, blurred vision relieved well with Excedrin PRN. She admits to weight gain over the past year, current BMI 40.  No prior exposure to Accutane, prolonged antibiotic use, hypervitaminosis A.   23 MRI Brain/Orbits/MRV-partially empty sella, \"asymmetric caliber transverse/sigmoid sinuses secondary to normal anatomic variation\"    PAST MEDICAL HISTORY:  Past Medical History:   Diagnosis Date    AR (allergic rhinitis) 2009    Asthma 2009    Eczema 2009    Essential hypertension     Labetelol 300 mg bid    HTN 2009    Papilledema        PAST SURGICAL HISTORY:  Past Surgical History:   Procedure Laterality Date    HX  SECTION N/A 5/4/15    Fetal intolerace of labor       FAMILY HISTORY:   Family History   Problem Relation Age of Onset    Breast Cancer Mother 40    Hypertension Father     Stroke Father     Hypertension Brother     Diabetes Brother         Type 2 DM    Diabetes Brother         Type 1 DM         SOCIAL HISTORY:  Social History     Socioeconomic History    Marital status:    Tobacco Use    Smoking status: Never    Smokeless tobacco: Never   Vaping Use Vaping Use: Never used   Substance and Sexual Activity    Alcohol use: Yes    Drug use: No    Sexual activity: Yes     Partners: Male     Birth control/protection: None     Social Determinants of Health     Financial Resource Strain: Low Risk     Difficulty of Paying Living Expenses: Not very hard   Food Insecurity: No Food Insecurity    Worried About Running Out of Food in the Last Year: Never true    Ran Out of Food in the Last Year: Never true   Transportation Needs: No Transportation Needs    Lack of Transportation (Medical): No    Lack of Transportation (Non-Medical): No   Physical Activity: Insufficiently Active    Days of Exercise per Week: 1 day    Minutes of Exercise per Session: 10 min   Housing Stability: Low Risk     Unable to Pay for Housing in the Last Year: No    Number of Places Lived in the Last Year: 2    Unstable Housing in the Last Year: No         MEDICATIONS:   Current Outpatient Medications   Medication Sig Dispense Refill    chlorthalidone (HYGROTON) 25 mg tablet Take 1 Tablet by mouth daily. 30 Tablet 1    lisinopriL (PRINIVIL, ZESTRIL) 40 mg tablet Take 1 Tablet by mouth daily. 30 Tablet 1    ZYRTEC 10 mg Tab Take 10 mg by mouth daily. ALLERGIES:  Allergies   Allergen Reactions    Amoxicillin Hives    Chocolate Flavor Nausea and Vomiting    Coffee (Coffea Arabica) Nausea and Vomiting    Milk Nausea and Vomiting    Potato Starch Other (comments)     \"Skin goes crazy\"    Nguyen Pod Other (comments)     eczema and migraines. WHITE BEANS    Onion Other (comments)     eczema and migraines    Pineapple Other (comments)     eczema and migraines    Sesame Oil Other (comments)     SESAME SEEDS cause exzema and migraines    Tomato Other (comments)     eczema and migraines    Seafood [Shellfish Containing Products] Hives     Hives itching         REVIEW OF SYSTEMS:  10 point ROS reviewed with patient. Please see scanned document under media.        PHYSICAL EXAM:  Vital Signs: Visit Vitals  BP (!) 140/88   Pulse 87   Ht 5' 7\" (1.702 m)   Wt 256 lb (116.1 kg)   SpO2 99%   Breastfeeding No   BMI 40.10 kg/m²        General Medical Exam:  General:  Well appearing, comfortable, in no apparent distress. Eyes/ENT: see cranial nerve examination. Neck: No masses appreciated. Full range of motion without tenderness. Respiratory:  Clear to auscultation, good air entry bilaterally. Cardiac:  Regular rate and rhythm, no murmur. GI:  Soft, non-tender, non-distended abdomen. Bowel sounds normal. No masses, organomegaly. Extremities:  No deformities, edema, or skin discoloration. Skin:  No rashes or lesions. Neurological:  Mental Status:  Alert and oriented to person, place, and time with fluent speech. Cranial Nerves:   CNII/III/IV/VI: +Papilledema b/l, visual fields full to confrontation, EOMI, PERRL, no ptosis or nystagmus. CN V: Facial sensation intact bilaterally, masseter 5/5   CN VII: Facial muscles symmetric and strong   CN VIII: Hears finger rub well bilaterally, intact vestibular function   CN IX/X: Normal palatal movement   CN XI: Full strength shoulder shrug bilaterally   CN XII: Tongue protrusion full and midline without fasciculation or atrophy  Motor: Normal tone and muscle bulk with no pronator drift. No atrophy or fasciculations present on examination.   Individual muscle group testing:  Shoulder abduction:   Left:5/5   Right : 5/5    Shoulder adduction:   Left:5/5   Right : 5/5    Elbow flexion:      Left:5/5   Right : 5/5  Elbow extension:    Left:5/5   Right : 5/5   Wrist flexion:    Left:5/5   Right : 5/5  Wrist extension:    Left:5/5   Right : 5/5  Arm pronation:   Left:5/5   Right : 5/5  Arm supination:   Left:5/5   Right : 5/5    Finger flexion:    Left:5/5   Right : 5/5    Finger extension:   Left:5/5   Right : 5/5   Finger abduction:  Left:5/5   Right : 5/5   Finger adduction:   Left:5/5   Right : 5/5  Hip flexion:     Left:5/5   Right : 5/5         Hip extension:   Left:5/5 Right : 5/5    Knee flexion:    Left:5/5   Right : 5/5    Knee extension:   Left:5/5   Right : 5/5    Dorsiflexion:     Left:5/5   Right : 5/5  Plantar flexion:    Left:5/5   Right : 5/5      MSRs: No crossed adductors or clonus. RIGHT  LEFT   Brachioradialis 2+ 2+   Biceps 2+ 2+   Triceps 2+ 2+   Knee 2+ 2+   Achilles 2+ 2+        Plantar response Downward Downward          Sensation: Normal and symmetric perception of pinprick, temperature, light touch, proprioception, and vibration; Coordination: No dysmetria. Normal rapid alternating movements; finger-to-nose and heel-to- shin testing are within normal limits. Gait: Normal native gait. PERTINENT DATA:  INTERNAL RECORDS:  The patient's electronic medical record was reviewed. The relevant details include:  CT Results (maximum last 3): Results from [place] encounter on 12/07/22    CT HEAD WO CONT    Narrative  EXAM: CT HEAD WO CONT    INDICATION: htn, r/o CVA    COMPARISON: None. CONTRAST: None. TECHNIQUE: Unenhanced CT of the head was performed using 5 mm images. Brain and  bone windows were generated. Coronal and sagittal reformats. CT dose reduction  was achieved through use of a standardized protocol tailored for this  examination and automatic exposure control for dose modulation. FINDINGS:  The ventricles and sulci are normal in size, shape and configuration. . There is  no significant white matter disease. There is no intracranial hemorrhage,  extra-axial collection, or mass effect. The basilar cisterns are open. No CT  evidence of acute infarct. The bone windows demonstrate no abnormalities. The visualized portions of the  paranasal sinuses and mastoid air cells are clear. Impression  No acute findings. MRI Results (maximum last 3): Results from [facility] encounter on 03/17/23    MRA BRAIN WO CONT      MRI BRAIN W WO CONT      MRI ORB FACE NECK W WO CONT      ASSESSMENT:      ICD-10-CM ICD-9-CM    1. Papilledema  H47.10 377.00 XR SPINAL PUNC LUMB DX      CULTURE, CSF W GRAM STAIN      PROTEIN, CSF      CELL COUNT, CSF      GLUCOSE, CSF      PROTEIN, CSF      CELL COUNT, CSF      GLUCOSE, CSF      2. Empty sella (HCC)  E23.6 253.8 XR SPINAL PUNC LUMB DX      CULTURE, CSF W GRAM STAIN      PROTEIN, CSF      CELL COUNT, CSF      GLUCOSE, CSF      PROTEIN, CSF      CELL COUNT, CSF      GLUCOSE, CSF        49year old female referred by Ophthalmology due to papilledema and imaging findings suggestive of possible idiopathic intracranial hypertension. She admits to intermittent migrainous headaches with associated blurred vision for several years. 2/9/23 MRI Brain/Orbits/MRV were reviewed with evidence of partially empty sella, no noted venous sinus thrombosis, noted hypoplastic L sigmoid/transverse sinus felt to represent normal variant. Will proceed with lumbar puncture including opening/closing pressures for diagnostic confirmation of suspected IIH. Discussed risk factors including weight gain, obesity which may contribute to above presentation and importance of weight loss. Also discussed the potential for threat to vision with IIH if diagnosis is confirmed and treatment options including initiation of acetazolamide. She will be contacted with results of her lumbar puncture once testing is completed. PLAN:  Lumbar puncture with opening/closing pressure for diagnostic confirmation of suspected IIH  Obtain outside Ophthalmology records      Follow-up and Dispositions    Return in about 3 months (around 6/28/2023). I have discussed the diagnosis with the patient today and the intended plan as seen in the above orders with both the patient as well as referring provider and/or PCP via electronic correspondence. The patient has received an after-visit summary and questions were answered concerning future plans. I have discussed medication side effects and warnings with the patient as well. Tess Torres Marcella Sams DO  Staff Neurologist  Diplomate, [facility] Board of Psychiatry & Neurology       CC Referring provider:  Elsa Eduardo MD

## 2023-03-28 NOTE — LETTER
3/28/2023 11:55 AM    Patient:  Debby Tuttle   YOB: 1975  Date of Visit: 3/28/2023      Dear Connie Paniagua MD  [address] [place] [phone]  Via Fax: [phone]: Thank you for referring Ms. Reddy Carter to me for evaluation/treatment. Below are the relevant portions of my assessment and plan of care. If you have questions, please do not hesitate to call me. I look forward to following Ms. Cardona along with you.         Sincerely,      Natalio Magaña, DO

## 2023-04-13 ENCOUNTER — HOSPITAL ENCOUNTER (OUTPATIENT)
Dept: GENERAL RADIOLOGY | Age: 48
Discharge: HOME | End: 2023-04-13
Payer: COMMERCIAL

## 2023-04-13 VITALS
DIASTOLIC BLOOD PRESSURE: 83 MMHG | RESPIRATION RATE: 16 BRPM | HEIGHT: 67 IN | HEART RATE: 69 BPM | OXYGEN SATURATION: 97 % | WEIGHT: 256 LBS | TEMPERATURE: 97.6 F | SYSTOLIC BLOOD PRESSURE: 140 MMHG | BODY MASS INDEX: 40.18 KG/M2

## 2023-04-13 DIAGNOSIS — H47.10 PAPILLEDEMA: ICD-10-CM

## 2023-04-13 DIAGNOSIS — E23.6 EMPTY SELLA (HCC): ICD-10-CM

## 2023-04-13 LAB
APPEARANCE CSF: CLEAR
COLOR CSF: COLORLESS
COLOR SPUN CSF: COLORLESS
GLUCOSE CSF-MCNC: 56 MG/DL (ref 40–70)
PROT CSF-MCNC: 38 MG/DL (ref 15–45)
RBC # CSF: 0 /CU MM
TUBE # CSF: 1
TUBE # CSF: 2
TUBE # CSF: 3
WBC # CSF: 0 /CU MM (ref 0–5)

## 2023-04-13 PROCEDURE — 77003 FLUOROGUIDE FOR SPINE INJECT: CPT

## 2023-04-13 PROCEDURE — 89050 BODY FLUID CELL COUNT: CPT

## 2023-04-13 PROCEDURE — 62270 DX LMBR SPI PNXR: CPT

## 2023-04-13 PROCEDURE — 82945 GLUCOSE OTHER FLUID: CPT

## 2023-04-13 PROCEDURE — 87205 SMEAR GRAM STAIN: CPT

## 2023-04-13 PROCEDURE — 84157 ASSAY OF PROTEIN OTHER: CPT

## 2023-04-13 NOTE — PROGRESS NOTES
IV removed-- tip intact, no redness, swelling or bleeding noted. VSS. Patient in no acute distress. DC instructions reviewed with  Cesar Guadalupe       -- verbalized understanding. Patient wheeled out to private vehicle-- no distress noted.

## 2023-04-13 NOTE — PERIOP NOTES
Patient alert and oriented x4, VS stable, no complaints of pain at this time.  at bedside. Band-aid on mid lower back, clean dry and intact.

## 2023-04-17 RX ORDER — ACETAZOLAMIDE 125 MG/1
250 TABLET ORAL 2 TIMES DAILY
Qty: 120 TABLET | Refills: 2 | Status: SHIPPED | OUTPATIENT
Start: 2023-04-17

## 2023-04-19 ENCOUNTER — HOSPITAL ENCOUNTER (OUTPATIENT)
Dept: GENERAL RADIOLOGY | Age: 48
Discharge: HOME | End: 2023-04-19
Payer: COMMERCIAL

## 2023-04-19 DIAGNOSIS — R79.89 ELEVATED SERUM CREATININE: Primary | ICD-10-CM

## 2023-04-19 DIAGNOSIS — R10.9 SIDE PAIN: ICD-10-CM

## 2023-04-19 LAB
BUN SERPL-MCNC: 24 MG/DL (ref 6–24)
BUN/CREAT SERPL: 22 (ref 9–23)
CALCIUM SERPL-MCNC: 9.1 MG/DL (ref 8.7–10.2)
CHLORIDE SERPL-SCNC: 105 MMOL/L (ref 96–106)
CO2 SERPL-SCNC: 18 MMOL/L (ref 20–29)
CREAT SERPL-MCNC: 1.08 MG/DL (ref 0.57–1)
EGFRCR SERPLBLD CKD-EPI 2021: 63 ML/MIN/1.73
GLUCOSE SERPL-MCNC: 86 MG/DL (ref 70–99)
POTASSIUM SERPL-SCNC: 3.9 MMOL/L (ref 3.5–5.2)
SODIUM SERPL-SCNC: 139 MMOL/L (ref 134–144)

## 2023-04-19 PROCEDURE — 71111 X-RAY EXAM RIBS/CHEST4/> VWS: CPT

## 2023-04-20 DIAGNOSIS — E66.01 CLASS 3 SEVERE OBESITY DUE TO EXCESS CALORIES WITHOUT SERIOUS COMORBIDITY WITH BODY MASS INDEX (BMI) OF 40.0 TO 44.9 IN ADULT (HCC): Primary | ICD-10-CM

## 2023-04-20 DIAGNOSIS — R06.83 SNORING: ICD-10-CM

## 2023-04-20 DIAGNOSIS — I10 ESSENTIAL HYPERTENSION: ICD-10-CM

## 2023-04-21 LAB
BACTERIA SPEC CULT: NORMAL
BACTERIA SPEC CULT: NORMAL
GRAM STN SPEC: NORMAL
SPECIAL REQUESTS,SREQ: NORMAL

## 2023-04-22 DIAGNOSIS — H47.11 PAPILLEDEMA ASSOCIATED WITH INCREASED INTRACRANIAL PRESSURE: Primary | ICD-10-CM

## 2023-04-28 ENCOUNTER — OFFICE VISIT (OUTPATIENT)
Dept: PRIMARY CARE CLINIC | Age: 48
End: 2023-04-28
Payer: COMMERCIAL

## 2023-04-28 VITALS
BODY MASS INDEX: 38.96 KG/M2 | OXYGEN SATURATION: 98 % | TEMPERATURE: 97.8 F | HEIGHT: 67 IN | RESPIRATION RATE: 15 BRPM | DIASTOLIC BLOOD PRESSURE: 61 MMHG | WEIGHT: 248.2 LBS | SYSTOLIC BLOOD PRESSURE: 105 MMHG | HEART RATE: 87 BPM

## 2023-04-28 DIAGNOSIS — R07.89 POSTERIOR CHEST PAIN: Primary | ICD-10-CM

## 2023-04-28 DIAGNOSIS — H47.10 PAPILLEDEMA: ICD-10-CM

## 2023-04-28 DIAGNOSIS — G93.2 INTRACRANIAL HYPERTENSION: ICD-10-CM

## 2023-04-28 DIAGNOSIS — E66.01 CLASS 3 SEVERE OBESITY DUE TO EXCESS CALORIES WITH SERIOUS COMORBIDITY AND BODY MASS INDEX (BMI) OF 40.0 TO 44.9 IN ADULT (HCC): ICD-10-CM

## 2023-04-28 PROCEDURE — 3074F SYST BP LT 130 MM HG: CPT | Performed by: FAMILY MEDICINE

## 2023-04-28 PROCEDURE — 99214 OFFICE O/P EST MOD 30 MIN: CPT | Performed by: FAMILY MEDICINE

## 2023-04-28 PROCEDURE — 3078F DIAST BP <80 MM HG: CPT | Performed by: FAMILY MEDICINE

## 2023-04-28 NOTE — PROGRESS NOTES
Visit Vitals  /61 (BP 1 Location: Left upper arm, BP Patient Position: Sitting, BP Cuff Size: Large adult)   Pulse 87   Temp 97.8 °F (36.6 °C) (Temporal)   Resp 15   Ht 5' 7\" (1.702 m)   Wt 248 lb 3.2 oz (112.6 kg)   SpO2 98%   BMI 38.87 kg/m²     Chief Complaint   Patient presents with    Labs    Follow-up     Xray results       1. \"Have you been to the ER, urgent care clinic since your last visit? Hospitalized since your last visit? \"  Yes/ [facility]/04/2023    2. \"Have you seen or consulted any other health care providers outside of the [address] [place] [address] since your last visit? \" No     3. For patients aged 39-70: Has the patient had a colonoscopy / FIT/ Cologuard? No-appt-5/1/2023      If the patient is female:    4. For patients aged 41-77: Has the patient had a mammogram within the past 2 years? Yes - Care Gap present. Most recent result on file      5. For patients aged 21-65: Has the patient had a pap smear? Yes - Care Gap present.  Most recent result on file     3 most recent PHQ Screens 4/28/2023   Little interest or pleasure in doing things Not at all   Feeling down, depressed, irritable, or hopeless Not at all   Total Score PHQ 2 0

## 2023-04-28 NOTE — PROGRESS NOTES
HPI     Chief Complaint   Patient presents with    Labs    Follow-up     Xray results        HPI:  Lawrence Sandoval is a 49 y.o. female who is here fore follow up. Rib pain: She gets bilateral rib pain sporadically mostly only occurs when working and not associated with palpitations. Discomfort is moreso along the scapula, often left sided. She is right hand dominant. She denies aches/pains elsewhere. No joint pain. No dyspnea. Stretching helps but does not make it go away. \"It's not muscular\". She currently does not feel it is worse with certain movements. IIH: w/ papilledema. Was on acetazolamide 500mg but had terrible side effects. Now on lower dose and tolerates somewhat better. She desires to come off meds. Knows weight loss could be beneficial \"I know what to do\" regarding diet and exercise. Pt motivated. Next neuro appt Summer 2023. Allergies   Allergen Reactions    Amoxicillin Hives    Chocolate Flavor Nausea and Vomiting    Coffee (Coffea Arabica) Nausea and Vomiting    Milk Nausea and Vomiting    Potato Starch Other (comments)     \"Skin goes crazy\"    Nguyen Pod Other (comments)     eczema and migraines. WHITE BEANS    Onion Other (comments)     eczema and migraines    Pineapple Other (comments)     eczema and migraines    Sesame Oil Other (comments)     SESAME SEEDS cause exzema and migraines    Tomato Other (comments)     eczema and migraines    Seafood [Shellfish Containing Products] Hives     Hives itching       Current Outpatient Medications   Medication Sig    acetaZOLAMIDE (DIAMOX) 125 mg tablet Take 2 Tablets by mouth two (2) times a day. lisinopriL (PRINIVIL, ZESTRIL) 40 mg tablet Take 1 Tablet by mouth daily. chlorthalidone (HYGROTON) 25 mg tablet Take 1 Tablet by mouth daily. ZYRTEC 10 mg Tab Take 1 Tablet by mouth daily. No current facility-administered medications for this visit. Review of Systems   Constitutional:  Negative for chills and fever. Respiratory:  Negative for cough and shortness of breath. Cardiovascular:  Negative for palpitations. Gastrointestinal:  Negative for abdominal pain, nausea and vomiting. Musculoskeletal:  Positive for myalgias. Negative for falls and joint pain. Reviewed PmHx, FmHx, SocHx as well as meds and allergies, updated and dated in the chart. Objective     Visit Vitals  /61 (BP 1 Location: Left upper arm, BP Patient Position: Sitting, BP Cuff Size: Large adult)   Pulse 87   Temp 97.8 °F (36.6 °C) (Temporal)   Resp 15   Ht 5' 7\" (1.702 m)   Wt 248 lb 3.2 oz (112.6 kg)   SpO2 98%   BMI 38.87 kg/m²     Physical Exam  Vitals and nursing note reviewed. Constitutional:       Appearance: She is not toxic-appearing. HENT:      Head: Normocephalic and atraumatic. Nose: Nose normal. No congestion. Cardiovascular:      Rate and Rhythm: Normal rate. Pulses: Normal pulses. Pulmonary:      Effort: Pulmonary effort is normal. No respiratory distress. Musculoskeletal:      Cervical back: Normal range of motion. Neurological:      Mental Status: She is alert. EKG: NSR. Machine states infarct not excluded but no dynamic ST changes on my review. Assessment and Plan     Non specific discomfort in posterior chest wall that pt does not feel is MSK in nature to her. Stretching does improve but not resolve symptoms. She denies recent travel but has FH of VTE. CXR normal.  EKG NSR. VSS. Proceed with CTA to rule out PE, dissection, organ pathology. Referring to dietician for IIH. Med regimen (acetazolamide) per Neurology. Diagnoses and all orders for this visit:    1. Posterior chest pain  -     CTA CHEST W OR W WO CONT; Future  -     AMB POC EKG ROUTINE W/ 12 LEADS, INTER & REP    2. Intracranial hypertension    3. Papilledema    4.  Class 3 severe obesity due to excess calories with serious comorbidity and body mass index (BMI) of 40.0 to 44.9 in adult [facility])           As applicable:  Medication Side Effects and Warnings were discussed with patient. Patient Labs were reviewed and or requested. Patient Past Records were reviewed and or requested. I have discussed the diagnosis with the patient and the intended plan as seen in the above orders. The patient has received an after-visit summary and questions were answered concerning future plans. I have discussed medication side effects and warnings with the patient as well.       Jane Miranda MD  [address] [place] [address]

## 2023-05-02 ENCOUNTER — TRANSCRIBE ORDERS (OUTPATIENT)
Facility: HOSPITAL | Age: 48
End: 2023-05-02

## 2023-05-02 DIAGNOSIS — R07.89 OTHER CHEST PAIN: Primary | ICD-10-CM

## 2023-05-11 ENCOUNTER — HOSPITAL ENCOUNTER (OUTPATIENT)
Facility: HOSPITAL | Age: 48
Discharge: HOME | End: 2023-05-11
Payer: COMMERCIAL

## 2023-05-11 DIAGNOSIS — R07.89 OTHER CHEST PAIN: ICD-10-CM

## 2023-05-11 PROCEDURE — 71275 CT ANGIOGRAPHY CHEST: CPT

## 2023-05-11 PROCEDURE — 6360000004 HC RX CONTRAST MEDICATION: Performed by: FAMILY MEDICINE

## 2023-05-11 RX ADMIN — IOPAMIDOL 100 ML: 755 INJECTION, SOLUTION INTRAVENOUS at 09:47

## 2023-05-12 DIAGNOSIS — I71.21 ANEURYSM OF ASCENDING AORTA WITHOUT RUPTURE (HCC): Primary | ICD-10-CM

## 2023-05-16 ENCOUNTER — TRANSCRIBE ORDERS (OUTPATIENT)
Facility: HOSPITAL | Age: 48
End: 2023-05-16

## 2023-05-16 DIAGNOSIS — R07.89 POSTERIOR CHEST PAIN: Primary | ICD-10-CM

## 2023-05-17 RX ORDER — TOPIRAMATE 50 MG/1
TABLET, FILM COATED ORAL
Qty: 120 TABLET | Refills: 5 | Status: SHIPPED | OUTPATIENT
Start: 2023-05-17

## 2023-05-22 ENCOUNTER — TELEPHONE (OUTPATIENT)
Age: 48
End: 2023-05-22

## 2023-05-22 ENCOUNTER — TELEPHONE (OUTPATIENT)
Dept: PRIMARY CARE CLINIC | Facility: CLINIC | Age: 48
End: 2023-05-22

## 2023-05-22 NOTE — TELEPHONE ENCOUNTER
----- Message from Barbara Quispe sent at 5/22/2023 10:23 AM EDT -----  Subject: Message to Provider    QUESTIONS  Information for Provider? Krystle Marieut would like to schedule lab work for her   upcoming appointment 05/25. The office Is currently unavailable so she   would like a call back to schedule if possible.   ---------------------------------------------------------------------------  --------------  Dylan Alicea INFO  [phone]; OK to leave message on voicemail  ---------------------------------------------------------------------------  --------------  SCRIPT ANSWERS  Relationship to Patient?  Self

## 2023-05-22 NOTE — TELEPHONE ENCOUNTER
The Pt. Has called and Is concerned about her care and spoke to  [employer]. The Diamox she is taking is making her feel badly and shaky. Wanting to see Provider sooner. Made an malinda. June 27 for sooner follow up. Any other recommendations please advise.

## 2023-05-23 ENCOUNTER — TELEPHONE (OUTPATIENT)
Age: 48
End: 2023-05-23

## 2023-05-23 NOTE — TELEPHONE ENCOUNTER
Called and spoke to the Pt. She has decided to stay on the Diamox and not take the Topiramate due to she is getting ready to have open heart surgery and does not want to change anything at this time. Will talk to Dr. Katelyn Romero about this at her up coming appt.

## 2023-05-25 ENCOUNTER — OFFICE VISIT (OUTPATIENT)
Dept: PRIMARY CARE CLINIC | Facility: CLINIC | Age: 48
End: 2023-05-25
Payer: COMMERCIAL

## 2023-05-25 VITALS
TEMPERATURE: 97.2 F | HEIGHT: 67 IN | SYSTOLIC BLOOD PRESSURE: 115 MMHG | DIASTOLIC BLOOD PRESSURE: 70 MMHG | HEART RATE: 70 BPM | WEIGHT: 235 LBS | OXYGEN SATURATION: 99 % | BODY MASS INDEX: 36.88 KG/M2 | RESPIRATION RATE: 20 BRPM

## 2023-05-25 DIAGNOSIS — G93.2 BENIGN INTRACRANIAL HYPERTENSION: ICD-10-CM

## 2023-05-25 DIAGNOSIS — F41.8 SITUATIONAL ANXIETY: ICD-10-CM

## 2023-05-25 DIAGNOSIS — I71.21 ANEURYSM OF ASCENDING AORTA WITHOUT RUPTURE (HCC): Primary | ICD-10-CM

## 2023-05-25 PROCEDURE — 3078F DIAST BP <80 MM HG: CPT | Performed by: FAMILY MEDICINE

## 2023-05-25 PROCEDURE — 3074F SYST BP LT 130 MM HG: CPT | Performed by: FAMILY MEDICINE

## 2023-05-25 PROCEDURE — 99214 OFFICE O/P EST MOD 30 MIN: CPT | Performed by: FAMILY MEDICINE

## 2023-05-25 RX ORDER — HYDROXYZINE HYDROCHLORIDE 25 MG/1
25 TABLET, FILM COATED ORAL 2 TIMES DAILY PRN
Qty: 60 TABLET | Refills: 2 | Status: SHIPPED | OUTPATIENT
Start: 2023-05-25 | End: 2023-06-24

## 2023-05-25 RX ORDER — ACETAZOLAMIDE 250 MG/1
TABLET ORAL
COMMUNITY
Start: 2023-05-17

## 2023-05-25 ASSESSMENT — ENCOUNTER SYMPTOMS
SHORTNESS OF BREATH: 0
COUGH: 0

## 2023-05-25 ASSESSMENT — PATIENT HEALTH QUESTIONNAIRE - PHQ9
SUM OF ALL RESPONSES TO PHQ QUESTIONS 1-9: 0
1. LITTLE INTEREST OR PLEASURE IN DOING THINGS: 0
SUM OF ALL RESPONSES TO PHQ QUESTIONS 1-9: 0
SUM OF ALL RESPONSES TO PHQ QUESTIONS 1-9: 0
SUM OF ALL RESPONSES TO PHQ9 QUESTIONS 1 & 2: 0
SUM OF ALL RESPONSES TO PHQ QUESTIONS 1-9: 0
2. FEELING DOWN, DEPRESSED OR HOPELESS: 0

## 2023-05-25 NOTE — PROGRESS NOTES
Identified Patient with two Patient identifiers(name and ). 1. Have you been to the ER, urgent care clinic since your last visit? Hospitalized since your last visit? No    2. Have you seen or consulted any other health care providers outside of the [address] [place] [address] since your last visit? Yes      3. For patients aged 39-70: Has the patient had a colonoscopy / FIT/ Cologuard? Yes-No Care Gap Present  2023  If the patient is female:    4. For patients aged 41-77: Has the patient had a mammogram within the past 2 years? Yes - Care Gap present. Most recent result on file. 5. For patients aged 21-65: Has the patient had a pap smear? Yes, Care Gap  /70 (Site: Right Upper Arm, Position: Sitting, Cuff Size: Large Adult)   Pulse 70   Temp 97.2 °F (36.2 °C) (Temporal)   Resp 20   Ht 5' 7\" (1.702 m)   Wt 235 lb (106.6 kg)   SpO2 99%   BMI 36.81 kg/m²     Chief Complaint   Patient presents with    Follow-up Chronic Condition       Health Maintenance Due   Topic Date Due    COVID-19 Vaccine (1) Never done    Cervical cancer screen  12/15/2016    Colorectal Cancer Screen  Never done          No questionnaires available.
have discussed medication side effects and warnings with the patient as well.       Kostas Ch MD  [address] [place] [address]

## 2023-06-06 ENCOUNTER — TELEPHONE (OUTPATIENT)
Dept: PRIMARY CARE CLINIC | Facility: CLINIC | Age: 48
End: 2023-06-06

## 2023-06-06 RX ORDER — CHLORTHALIDONE 25 MG/1
25 TABLET ORAL DAILY
Qty: 90 TABLET | Refills: 0 | Status: SHIPPED | OUTPATIENT
Start: 2023-06-06

## 2023-06-06 NOTE — TELEPHONE ENCOUNTER
Regarding: Refill needed  Contact: [phone]  ----- Message from Mel Phillips LPN sent at 1/5/7753  1:49 PM EDT -----       ----- Message from Leidy Clifford to Danya Tadeo MD sent at 6/3/2023 10:42 AM -----   I need a 90 day prescription sent to my pharmacy for chlorthalidone 25 mg. I can't refill through the pharmacy and also can not do it through My Chart in the meds tab. It may still be under Dr. Nica Vergara who was the original prescriber. My pharmacy is the [facility] at [address]. Thanks!      Micah Figueroa   [phone]

## 2023-06-27 ENCOUNTER — OFFICE VISIT (OUTPATIENT)
Age: 48
End: 2023-06-27
Payer: COMMERCIAL

## 2023-06-27 VITALS
DIASTOLIC BLOOD PRESSURE: 88 MMHG | HEIGHT: 67 IN | WEIGHT: 228 LBS | HEART RATE: 87 BPM | BODY MASS INDEX: 35.79 KG/M2 | OXYGEN SATURATION: 98 % | SYSTOLIC BLOOD PRESSURE: 122 MMHG

## 2023-06-27 DIAGNOSIS — H47.10 PAPILLEDEMA: ICD-10-CM

## 2023-06-27 DIAGNOSIS — T88.7XXA MEDICATION SIDE EFFECT: ICD-10-CM

## 2023-06-27 DIAGNOSIS — G93.2 IIH (IDIOPATHIC INTRACRANIAL HYPERTENSION): Primary | ICD-10-CM

## 2023-06-27 PROCEDURE — 3079F DIAST BP 80-89 MM HG: CPT | Performed by: PSYCHIATRY & NEUROLOGY

## 2023-06-27 PROCEDURE — 3074F SYST BP LT 130 MM HG: CPT | Performed by: PSYCHIATRY & NEUROLOGY

## 2023-06-27 PROCEDURE — 99214 OFFICE O/P EST MOD 30 MIN: CPT | Performed by: PSYCHIATRY & NEUROLOGY

## 2023-06-27 RX ORDER — ACETAZOLAMIDE 250 MG/1
250 TABLET ORAL
Qty: 90 TABLET | Refills: 1 | Status: SHIPPED | OUTPATIENT
Start: 2023-06-27

## 2023-07-13 RX ORDER — ACETAZOLAMIDE 250 MG/1
TABLET ORAL
Qty: 60 TABLET | Refills: 2 | OUTPATIENT
Start: 2023-07-13

## 2023-08-21 RX ORDER — ACETAZOLAMIDE 250 MG/1
TABLET ORAL
Qty: 60 TABLET | Refills: 2 | OUTPATIENT
Start: 2023-08-21

## 2023-08-22 ENCOUNTER — OFFICE VISIT (OUTPATIENT)
Dept: PRIMARY CARE CLINIC | Facility: CLINIC | Age: 48
End: 2023-08-22
Payer: COMMERCIAL

## 2023-08-22 VITALS
WEIGHT: 218.2 LBS | HEART RATE: 61 BPM | BODY MASS INDEX: 34.25 KG/M2 | DIASTOLIC BLOOD PRESSURE: 66 MMHG | HEIGHT: 67 IN | TEMPERATURE: 97.3 F | SYSTOLIC BLOOD PRESSURE: 114 MMHG

## 2023-08-22 DIAGNOSIS — I10 ESSENTIAL HYPERTENSION: Chronic | ICD-10-CM

## 2023-08-22 DIAGNOSIS — I26.99 OTHER ACUTE PULMONARY EMBOLISM WITHOUT ACUTE COR PULMONALE (HCC): ICD-10-CM

## 2023-08-22 DIAGNOSIS — E87.6 HYPOKALEMIA: Primary | ICD-10-CM

## 2023-08-22 DIAGNOSIS — S29.012A STRAIN OF MUSCLE AND TENDON OF BACK WALL OF THORAX, INITIAL ENCOUNTER: ICD-10-CM

## 2023-08-22 PROCEDURE — 99214 OFFICE O/P EST MOD 30 MIN: CPT | Performed by: NURSE PRACTITIONER

## 2023-08-22 PROCEDURE — 3078F DIAST BP <80 MM HG: CPT | Performed by: NURSE PRACTITIONER

## 2023-08-22 PROCEDURE — 3074F SYST BP LT 130 MM HG: CPT | Performed by: NURSE PRACTITIONER

## 2023-08-22 RX ORDER — APIXABAN 5 MG/1
5 TABLET, FILM COATED ORAL 2 TIMES DAILY
COMMUNITY
Start: 2023-08-15

## 2023-08-22 RX ORDER — CHLORTHALIDONE 25 MG/1
25 TABLET ORAL DAILY
Qty: 90 TABLET | Refills: 0 | Status: SHIPPED | OUTPATIENT
Start: 2023-08-22

## 2023-08-22 RX ORDER — AMIODARONE HYDROCHLORIDE 200 MG/1
200 TABLET ORAL DAILY
COMMUNITY
Start: 2023-08-14

## 2023-08-22 SDOH — ECONOMIC STABILITY: INCOME INSECURITY: HOW HARD IS IT FOR YOU TO PAY FOR THE VERY BASICS LIKE FOOD, HOUSING, MEDICAL CARE, AND HEATING?: NOT HARD AT ALL

## 2023-08-22 SDOH — ECONOMIC STABILITY: FOOD INSECURITY: WITHIN THE PAST 12 MONTHS, YOU WORRIED THAT YOUR FOOD WOULD RUN OUT BEFORE YOU GOT MONEY TO BUY MORE.: NEVER TRUE

## 2023-08-22 SDOH — ECONOMIC STABILITY: FOOD INSECURITY: WITHIN THE PAST 12 MONTHS, THE FOOD YOU BOUGHT JUST DIDN'T LAST AND YOU DIDN'T HAVE MONEY TO GET MORE.: NEVER TRUE

## 2023-08-22 SDOH — ECONOMIC STABILITY: HOUSING INSECURITY
IN THE LAST 12 MONTHS, WAS THERE A TIME WHEN YOU DID NOT HAVE A STEADY PLACE TO SLEEP OR SLEPT IN A SHELTER (INCLUDING NOW)?: NO

## 2023-08-22 ASSESSMENT — PATIENT HEALTH QUESTIONNAIRE - PHQ9
SUM OF ALL RESPONSES TO PHQ QUESTIONS 1-9: 0
SUM OF ALL RESPONSES TO PHQ9 QUESTIONS 1 & 2: 0
SUM OF ALL RESPONSES TO PHQ QUESTIONS 1-9: 0
2. FEELING DOWN, DEPRESSED OR HOPELESS: 0
1. LITTLE INTEREST OR PLEASURE IN DOING THINGS: 0
SUM OF ALL RESPONSES TO PHQ QUESTIONS 1-9: 0
SUM OF ALL RESPONSES TO PHQ QUESTIONS 1-9: 0

## 2023-08-22 ASSESSMENT — ENCOUNTER SYMPTOMS
BACK PAIN: 1
CHEST TIGHTNESS: 0
SHORTNESS OF BREATH: 0

## 2023-08-22 NOTE — PROGRESS NOTES
Identified Patient with two Patient identifiers(name and ). 1. Have you been to the ER, urgent care clinic since your last visit? Hospitalized since your last visit? No    2. Have you seen or consulted any other health care providers outside of the [address] [place] [address] since your last visit? No     3. For patients aged 43-73: Has the patient had a colonoscopy / FIT/ Cologuard? No    If the patient is female:    4. For patients aged 43-66: Has the patient had a mammogram within the past 2 years? Yes-No Care Gap Present      5. For patients aged 21-65: Has the patient had a pap smear? Yes-No Care Gap Present   There were no vitals taken for this visit. Chief Complaint   Patient presents with    Follow-up     Open heart surgery f/u        Health Maintenance Due   Topic Date Due    COVID-19 Vaccine (1) Never done    Colorectal Cancer Screen  Never done    Flu vaccine (1) 2023          No questionnaires available.
tablet Take 1 tablet by mouth daily      ELIQUIS 5 MG TABS tablet Take 1 tablet by mouth 2 times daily      metoprolol tartrate (LOPRESSOR) 25 MG tablet Take 1 tablet by mouth in the morning and 1 tablet in the evening. chlorthalidone (HYGROTON) 25 MG tablet Take 1 tablet by mouth daily 90 tablet 0    HYDROXYZINE HCL PO Take by mouth 2 times daily as needed      acetaZOLAMIDE (DIAMOX) 250 MG tablet Take 1 tablet by mouth nightly 90 tablet 1    cetirizine (ZYRTEC) 10 MG tablet Take 1 tablet by mouth daily       No current facility-administered medications for this visit.      Allergies   Allergen Reactions    Amoxicillin Hives    Chocolate Flavor Nausea And Vomiting    Milk (Cow) Nausea And Vomiting    Starch Other (See Comments)     \"Skin goes crazy\"    Onion Other (See Comments)     eczema and migraines    Pineapple Other (See Comments)     eczema and migraines    Sesame Oil Other (See Comments)     SESAME SEEDS cause exzema and migraines    Tomato Other (See Comments)     eczema and migraines    Shellfish Allergy Hives     Hives itching     Past Medical History:   Diagnosis Date    AR (allergic rhinitis) 2009    Asthma 2009    Eczema 2009    Essential hypertension     Labetelol 300 mg bid    Papilledema      Past Surgical History:   Procedure Laterality Date    CARDIAC SURGERY       SECTION N/A 2015    Fetal intolerace of labor    GYN  5/4/15         Family History   Problem Relation Age of Onset    Hypertension Father     Stroke Father         Passed away of stroke complications/blood clotting issues 2023    Hypertension Mother     Hypertension Brother     Cancer Mother     Diabetes Brother         Type 1 DM    Diabetes Brother         Type 1 diabetic    Breast Cancer Mother 40     Social History     Tobacco Use    Smoking status: Never    Smokeless tobacco: Never   Substance Use Topics    Alcohol use: Yes           Review of Systems   Constitutional:  Negative for

## 2023-08-24 LAB
BUN SERPL-MCNC: 20 MG/DL (ref 6–24)
BUN/CREAT SERPL: 13 (ref 9–23)
CALCIUM SERPL-MCNC: 9.1 MG/DL (ref 8.7–10.2)
CHLORIDE SERPL-SCNC: 103 MMOL/L (ref 96–106)
CO2 SERPL-SCNC: 22 MMOL/L (ref 20–29)
CREAT SERPL-MCNC: 1.49 MG/DL (ref 0.57–1)
EGFRCR SERPLBLD CKD-EPI 2021: 43 ML/MIN/1.73
GLUCOSE SERPL-MCNC: 86 MG/DL (ref 70–99)
POTASSIUM SERPL-SCNC: 3.8 MMOL/L (ref 3.5–5.2)
SODIUM SERPL-SCNC: 140 MMOL/L (ref 134–144)

## 2023-09-20 ENCOUNTER — HOSPITAL ENCOUNTER (OUTPATIENT)
Facility: HOSPITAL | Age: 48
Setting detail: RECURRING SERIES
Discharge: HOME | End: 2023-09-23
Payer: COMMERCIAL

## 2023-09-20 NOTE — THERAPY EVALUATION
OBJECTIVE    Posture wnl  Arom back, arms, legs, dps, neck wnl  Walk toes/heels wnl  Gait wnl  Wrists/elbows arom wnl  Supine; no LLD. SLR prom wnl. . prom shoulders flex/abd/ER/IR    ejlik=216/135/120/60   ufws=297/133/120/40  Ankle rom on 17 degree and 27 degree ramps wnl  Objective/Functional Outcome Measure: 29%  AM-PAC  AM-PAC score = an established functional score where 0% = no disability       60 min [x]Eval - untimed                              [x]  Patient Education billed concurrently with other procedures   [x] Review HEP    [] Progressed/Changed HEP, detail:    [] Other detail:         Pain Level at end of session (0-10 scale): 1    Plan of Care / Statement of Necessity for Physical Therapy Services     Assessment / key information:  general reconditioning s/p thoracotomy    Evaluation Complexity:  History:  HIGH Complexity :3+ comorbidities / personal factors will impact the outcome/ POC ; Examination:  LOW Complexity : 1-2 Standardized tests and measures addressing body structure, function, activity limitation and / or participation in recreation  ;Presentation:  LOW Complexity : Stable, uncomplicated  ;Clinical Decision Making:  MEDIUM Complexity : FOTO score of 26-74 Overall Complexity Rating: LOW   Problem List: pain affecting function and decrease activity tolerance   Treatment Plan may include any combination of the followin Therapeutic Exercise, 20667 Neuromuscular Re-Education, and 51318 Manual Therapy  Patient / Family readiness to learn indicated by: asking questions  Persons(s) to be included in education: patient (P)  Barriers to Learning/Limitations: none  Measures taken if barriers to learning present: na  Patient Self Reported Health Status: good  Rehabilitation Potential: good    Short Term Goals: To be accomplished in 6 treatments. Independent in HEP to work towards goals  Long Term Goals: To be accomplished in 12 treatments.   All ADLs full without

## 2023-09-22 ENCOUNTER — HOSPITAL ENCOUNTER (OUTPATIENT)
Facility: HOSPITAL | Age: 48
Setting detail: RECURRING SERIES
Discharge: HOME | End: 2023-09-25
Payer: COMMERCIAL

## 2023-09-22 ENCOUNTER — TELEPHONE (OUTPATIENT)
Age: 48
End: 2023-09-22

## 2023-09-22 PROCEDURE — 97110 THERAPEUTIC EXERCISES: CPT

## 2023-09-22 NOTE — PROGRESS NOTES
PHYSICAL THERAPY - DAILY TREATMENT NOTE (updated 3/23)      Date: 2023          Patient Name:  Sushma Gasca :  1975   Medical   Diagnosis:  Strain of muscle and tendon of back wall of thorax, initial encounter [S29.012A] Treatment Diagnosis:  Strain of muscle and tendon of back wall of thorax, initial encounter [S29.012A]   Referral Source:  Awa Snowden, APRN -* Insurance:   Payor: Sweetie Henderson / Plan: Sweetie Henderson / Product Type: *No Product type* /                     Patient  verified yes     Visit #   Current  / Total 2 12   Time   In / Out 240 325   Total Treatment Time 45   Total Timed Codes 39         SUBJECTIVE    Pain Level (0-10 scale): 1    Any medication changes, allergies to medications, adverse drug reactions, diagnosis change, or new procedure performed?: [x] No    [] Yes (see summary sheet for update)  Medications: Verified on Patient Summary List    Subjective functional status/changes: \"No real pain, just some tightness\"    OBJECTIVE      Therapeutic Procedures: Tx Min Billable or 1:1 Min (if diff from Tx Min) Procedure, Rationale, Specifics   39  88316 Therapeutic Exercise (timed):  increase ROM, strength, coordination, balance, and proprioception to improve patient's ability to progress to PLOF and address remaining functional goals. (see flow sheet as applicable)     Details if applicable:       34145 Manual Therapy (timed):  decrease pain, increase ROM, and increase tissue extensibility to improve patient's ability to progress to PLOF and address remaining functional goals. The manual therapy interventions were performed at a separate and distinct time from the therapeutic activities interventions .  (see flow sheet as applicable)     Details if applicable:           Details if applicable:           Details if applicable:            Details if applicable:     39     Total Total         [x]  Patient Education billed concurrently with other procedures   [x] Review HEP    []

## 2023-09-22 NOTE — TELEPHONE ENCOUNTER
Called patient to follow up on referral to [id number]. No answer, left voicemail advising patient to return call.

## 2023-09-27 ENCOUNTER — HOSPITAL ENCOUNTER (OUTPATIENT)
Facility: HOSPITAL | Age: 48
Setting detail: RECURRING SERIES
Discharge: HOME | End: 2023-09-30
Payer: COMMERCIAL

## 2023-09-27 PROCEDURE — 97110 THERAPEUTIC EXERCISES: CPT

## 2023-09-27 NOTE — PROGRESS NOTES
PHYSICAL THERAPY - DAILY TREATMENT NOTE (updated 3/23)      Date: 2023          Patient Name:  Chelsi Carbajal :  1975   Medical   Diagnosis:  Strain of muscle and tendon of back wall of thorax, initial encounter [S29.012A] Treatment Diagnosis:  Strain of muscle and tendon of back wall of thorax, initial encounter [S29.012A]   Referral Source:  Ze Snowden, LUIS ANTONIO -* Insurance:   Payor: [employer] / Plan: [employer] / Product Type: *No Product type* /                     Patient  verified yes     Visit #   Current  / Total 3 12   Time   In / Out 1100 1145   Total Treatment Time 45   Total Timed Codes 40         SUBJECTIVE    Pain Level (0-10 scale): 0    Any medication changes, allergies to medications, adverse drug reactions, diagnosis change, or new procedure performed?: [x] No    [] Yes (see summary sheet for update)  Medications: Verified on Patient Summary List    Subjective functional status/changes: \"No real pain, just some tightness\". Shoulders got tired doing these exercises. OBJECTIVE      Therapeutic Procedures: Tx Min Billable or 1:1 Min (if diff from Tx Min) Procedure, Rationale, Specifics   40  20658 Therapeutic Exercise (timed):  increase ROM, strength, coordination, balance, and proprioception to improve patient's ability to progress to PLOF and address remaining functional goals. (see flow sheet as applicable)     Details if applicable:       87809 Manual Therapy (timed):  decrease pain, increase ROM, and increase tissue extensibility to improve patient's ability to progress to PLOF and address remaining functional goals. The manual therapy interventions were performed at a separate and distinct time from the therapeutic activities interventions .  (see flow sheet as applicable)     Details if applicable:           Details if applicable:           Details if applicable:            Details if applicable:     40     Total Total         [x]  Patient Education billed concurrently

## 2023-09-28 ENCOUNTER — HOSPITAL ENCOUNTER (OUTPATIENT)
Facility: HOSPITAL | Age: 48
Setting detail: RECURRING SERIES
End: 2023-09-28
Payer: COMMERCIAL

## 2023-09-28 PROCEDURE — 97110 THERAPEUTIC EXERCISES: CPT

## 2023-09-28 NOTE — PROGRESS NOTES
PHYSICAL THERAPY - DAILY TREATMENT NOTE (updated 3/23)      Date: 2023          Patient Name:  Robe Brown :  1975   Medical   Diagnosis:  Strain of muscle and tendon of back wall of thorax, initial encounter [S29.012A] Treatment Diagnosis:  Strain of muscle and tendon of back wall of thorax, initial encounter [S29.012A]   Referral Source:  Fransisco Snowden, APRN -* Insurance:   Payor: [employer] / Plan: Erminio Lorna / Product Type: *No Product type* /                     Patient  verified yes     Visit #   Current  / Total 4 12   Time   In / Out 11:32 12:15   Total Treatment Time 43   Total Timed Codes 43         SUBJECTIVE    Pain Level (0-10 scale): 0    Any medication changes, allergies to medications, adverse drug reactions, diagnosis change, or new procedure performed?: [x] No    [] Yes (see summary sheet for update)  Medications: Verified on Patient Summary List    Subjective functional status/changes: \"No real pain, just some tightness\". Shoulders got tired doing these exercises. OBJECTIVE      Therapeutic Procedures: Tx Min Billable or 1:1 Min (if diff from Tx Min) Procedure, Rationale, Specifics   43  21441 Therapeutic Exercise (timed):  increase ROM, strength, coordination, balance, and proprioception to improve patient's ability to progress to PLOF and address remaining functional goals. (see flow sheet as applicable)     Details if applicable:       51961 Manual Therapy (timed):  decrease pain, increase ROM, and increase tissue extensibility to improve patient's ability to progress to PLOF and address remaining functional goals. The manual therapy interventions were performed at a separate and distinct time from the therapeutic activities interventions .  (see flow sheet as applicable)     Details if applicable:           Details if applicable:           Details if applicable:            Details if applicable:     43     Total Total         [x]  Patient Education billed concurrently with other procedures   [x] Review HEP    [] Progressed/Changed HEP, detail:    [] Other detail:    Access Code: [email or web address]  URL: [email or web address]  Date: 09/22/2023  Prepared by: Sarah Diaz    Exercises  - Supine Piriformis Stretch with Foot on Ground  - 1 x daily - 7 x weekly - 3 sets - 10 reps  - Child's Pose Stretch  - 1 x daily - 7 x weekly - 3 sets - 10 reps  - Supine Single Knee to Chest Stretch  - 1 x daily - 7 x weekly - 3 sets - 10 reps  - Seated Alternating Side Stretch with Arm Overhead  - 1 x daily - 7 x weekly - 3 sets - 10 reps      Other Objective/Functional Measures      Pain Level at end of session (0-10 scale): 0      Assessment   Pt tolerated session well, no adverse reactions. Added p/o to HEP for pt to work on. Pt presents with slight reduced tolerance toward end of tasks noting weakness. Working towards my goals. Patient will continue to benefit from skilled PT / OT services to modify and progress therapeutic interventions, analyze and address functional mobility deficits, analyze and address ROM deficits, and analyze and address strength deficits to address functional deficits and attain remaining goals. Progress toward goals / Updated goals:  []  See Progress Note/Recertification    Short Term Goals: To be accomplished in 6 treatments. Independent in HEP to work towards goals  Long Term Goals: To be accomplished in 12 treatments.   All ADLs full without limitation      PLAN  Yes  Continue plan of care  Re-Cert Due: 98/52/09  [x]  Upgrade activities as tolerated  []  Discharge due to :  []  Other:      Alonso Nix PTA       9/28/2023       11:44 AM

## 2023-11-18 DIAGNOSIS — I10 ESSENTIAL HYPERTENSION: Chronic | ICD-10-CM

## 2023-11-20 RX ORDER — CHLORTHALIDONE 25 MG/1
25 TABLET ORAL DAILY
Qty: 90 TABLET | Refills: 0 | Status: SHIPPED | OUTPATIENT
Start: 2023-11-20

## 2024-02-24 DIAGNOSIS — I10 ESSENTIAL HYPERTENSION: Chronic | ICD-10-CM

## 2024-02-26 RX ORDER — CHLORTHALIDONE 25 MG/1
25 TABLET ORAL DAILY
Qty: 90 TABLET | Refills: 0 | Status: SHIPPED | OUTPATIENT
Start: 2024-02-26

## 2024-07-21 DIAGNOSIS — I10 ESSENTIAL HYPERTENSION: Chronic | ICD-10-CM

## 2024-07-22 RX ORDER — CHLORTHALIDONE 25 MG/1
25 TABLET ORAL DAILY
Qty: 30 TABLET | Refills: 2 | OUTPATIENT
Start: 2024-07-22